# Patient Record
Sex: FEMALE | Race: WHITE | NOT HISPANIC OR LATINO | Employment: OTHER | URBAN - METROPOLITAN AREA
[De-identification: names, ages, dates, MRNs, and addresses within clinical notes are randomized per-mention and may not be internally consistent; named-entity substitution may affect disease eponyms.]

---

## 2023-03-25 LAB — SARS-COV-2 RESULT: NOT DETECTED

## 2023-03-27 ENCOUNTER — HOSPITAL ENCOUNTER (OUTPATIENT)
Dept: DATA CONVERSION | Facility: HOSPITAL | Age: 68
End: 2023-03-28
Attending: SURGERY | Admitting: SURGERY
Payer: COMMERCIAL

## 2023-03-27 DIAGNOSIS — J44.9 CHRONIC OBSTRUCTIVE PULMONARY DISEASE, UNSPECIFIED (MULTI): ICD-10-CM

## 2023-03-27 DIAGNOSIS — I10 ESSENTIAL (PRIMARY) HYPERTENSION: ICD-10-CM

## 2023-03-27 DIAGNOSIS — Z87.891 PERSONAL HISTORY OF NICOTINE DEPENDENCE: ICD-10-CM

## 2023-03-27 DIAGNOSIS — I48.20 CHRONIC ATRIAL FIBRILLATION, UNSPECIFIED (MULTI): ICD-10-CM

## 2023-03-27 DIAGNOSIS — M06.9 RHEUMATOID ARTHRITIS, UNSPECIFIED (MULTI): ICD-10-CM

## 2023-03-27 DIAGNOSIS — E04.2 NONTOXIC MULTINODULAR GOITER: ICD-10-CM

## 2023-03-27 DIAGNOSIS — C73 MALIGNANT NEOPLASM OF THYROID GLAND (MULTI): ICD-10-CM

## 2023-03-27 DIAGNOSIS — Z80.8 FAMILY HISTORY OF MALIGNANT NEOPLASM OF OTHER ORGANS OR SYSTEMS: ICD-10-CM

## 2023-03-27 DIAGNOSIS — E78.5 HYPERLIPIDEMIA, UNSPECIFIED: ICD-10-CM

## 2023-03-27 DIAGNOSIS — K21.9 GASTRO-ESOPHAGEAL REFLUX DISEASE WITHOUT ESOPHAGITIS: ICD-10-CM

## 2023-03-27 LAB
CALCIUM (MG/DL) IN SER/PLAS: 8.1 MG/DL (ref 8.6–10.3)
INTRAOPERATIVE PTH: 52 PG/ML (ref 12–88)

## 2023-03-28 LAB — CALCIUM (MG/DL) IN SER/PLAS: 8.5 MG/DL (ref 8.6–10.3)

## 2023-04-17 LAB — CALCIUM (MG/DL) IN SER/PLAS: 10.1 MG/DL (ref 8.6–10.6)

## 2023-04-27 LAB
COMPLETE PATHOLOGY REPORT: NORMAL
CONVERTED CLINICAL DIAGNOSIS-HISTORY: NORMAL
CONVERTED DIAGNOSIS COMMENT: NORMAL
CONVERTED FINAL DIAGNOSIS: NORMAL
CONVERTED FINAL REPORT PDF LINK TO COPY AND PASTE: NORMAL
CONVERTED GROSS DESCRIPTION: NORMAL
CONVERTED SYNOPTIC DIAGNOSIS: NORMAL

## 2023-07-13 LAB
ALANINE AMINOTRANSFERASE (SGPT) (U/L) IN SER/PLAS: 16 U/L (ref 7–45)
ALBUMIN (G/DL) IN SER/PLAS: 4.1 G/DL (ref 3.4–5)
ALKALINE PHOSPHATASE (U/L) IN SER/PLAS: 61 U/L (ref 33–136)
ANION GAP IN SER/PLAS: 10 MMOL/L (ref 10–20)
ASPARTATE AMINOTRANSFERASE (SGOT) (U/L) IN SER/PLAS: 18 U/L (ref 9–39)
BILIRUBIN TOTAL (MG/DL) IN SER/PLAS: 0.5 MG/DL (ref 0–1.2)
CALCIUM (MG/DL) IN SER/PLAS: 9.7 MG/DL (ref 8.6–10.6)
CARBON DIOXIDE, TOTAL (MMOL/L) IN SER/PLAS: 31 MMOL/L (ref 21–32)
CHLORIDE (MMOL/L) IN SER/PLAS: 106 MMOL/L (ref 98–107)
CREATININE (MG/DL) IN SER/PLAS: 0.57 MG/DL (ref 0.5–1.05)
GFR FEMALE: >90 ML/MIN/1.73M2
GLUCOSE (MG/DL) IN SER/PLAS: 84 MG/DL (ref 74–99)
POTASSIUM (MMOL/L) IN SER/PLAS: 4.7 MMOL/L (ref 3.5–5.3)
PROTEIN TOTAL: 6.5 G/DL (ref 6.4–8.2)
SODIUM (MMOL/L) IN SER/PLAS: 142 MMOL/L (ref 136–145)
THYROTROPIN (MIU/L) IN SER/PLAS BY DETECTION LIMIT <= 0.05 MIU/L: 0.07 MIU/L (ref 0.44–3.98)
THYROXINE (T4) FREE (NG/DL) IN SER/PLAS: 1.55 NG/DL (ref 0.78–1.48)
UREA NITROGEN (MG/DL) IN SER/PLAS: 13 MG/DL (ref 6–23)

## 2023-07-17 LAB
THYROGLOBULIN AB (IU/ML) IN SER/PLAS: <0.9 IU/ML (ref 0–4)
THYROGLOBULIN LC-MS/MS: ABNORMAL NG/ML (ref 1.3–31.8)
THYROGLOBULIN: 0.1 NG/ML (ref 1.3–31.8)

## 2023-08-21 PROBLEM — K21.9 ESOPHAGEAL REFLUX: Status: ACTIVE | Noted: 2023-08-21

## 2023-08-21 PROBLEM — I20.9 ANGINA PECTORIS (CMS-HCC): Status: ACTIVE | Noted: 2023-08-21

## 2023-08-21 PROBLEM — N95.2 VAGINAL ATROPHY: Status: ACTIVE | Noted: 2023-08-21

## 2023-08-21 PROBLEM — R06.00 DYSPNEA: Status: ACTIVE | Noted: 2023-08-21

## 2023-08-21 PROBLEM — M79.10 MYALGIA: Status: ACTIVE | Noted: 2023-08-21

## 2023-08-21 PROBLEM — R33.9 URINARY RETENTION WITH INCOMPLETE BLADDER EMPTYING: Status: ACTIVE | Noted: 2023-08-21

## 2023-08-21 PROBLEM — M25.569 KNEE PAIN: Status: ACTIVE | Noted: 2023-08-21

## 2023-08-21 PROBLEM — M75.41 IMPINGEMENT SYNDROME OF RIGHT SHOULDER: Status: ACTIVE | Noted: 2023-08-21

## 2023-08-21 PROBLEM — M25.549 PAIN IN JOINT, HAND: Status: ACTIVE | Noted: 2023-08-21

## 2023-08-21 PROBLEM — M25.551 RIGHT HIP PAIN: Status: ACTIVE | Noted: 2023-08-21

## 2023-08-21 PROBLEM — M19.90 OSTEOARTHRITIS: Status: ACTIVE | Noted: 2023-08-21

## 2023-08-21 PROBLEM — K64.2 THIRD DEGREE HEMORRHOIDS: Status: ACTIVE | Noted: 2023-08-21

## 2023-08-21 PROBLEM — M65.30 TRIGGER FINGER, ACQUIRED: Status: ACTIVE | Noted: 2023-08-21

## 2023-08-21 PROBLEM — N39.3 FEMALE STRESS INCONTINENCE: Status: ACTIVE | Noted: 2023-08-21

## 2023-08-21 PROBLEM — R11.2 NAUSEA AND VOMITING: Status: ACTIVE | Noted: 2023-08-21

## 2023-08-21 PROBLEM — R06.02 SHORTNESS OF BREATH ON EXERTION: Status: ACTIVE | Noted: 2023-08-21

## 2023-08-21 PROBLEM — M25.811 IMPINGEMENT OF RIGHT SHOULDER: Status: ACTIVE | Noted: 2023-08-21

## 2023-08-21 PROBLEM — S42.309A FRACTURE OF HUMERUS: Status: ACTIVE | Noted: 2023-08-21

## 2023-08-21 PROBLEM — M25.531 RIGHT WRIST PAIN: Status: ACTIVE | Noted: 2023-08-21

## 2023-08-21 PROBLEM — M34.9 SCLERODERMA (MULTI): Status: ACTIVE | Noted: 2023-08-21

## 2023-08-21 PROBLEM — N88.9 CERVICAL LESION: Status: ACTIVE | Noted: 2023-08-21

## 2023-08-21 PROBLEM — M25.511 RIGHT SHOULDER PAIN: Status: ACTIVE | Noted: 2023-08-21

## 2023-08-21 PROBLEM — M15.9 GENERALIZED OSTEOARTHRITIS: Status: ACTIVE | Noted: 2023-08-21

## 2023-08-21 PROBLEM — R07.9 CHEST PAIN: Status: ACTIVE | Noted: 2023-08-21

## 2023-08-21 PROBLEM — N81.3 CYSTOCELE AND RECTOCELE WITH COMPLETE UTEROVAGINAL PROLAPSE: Status: ACTIVE | Noted: 2023-08-21

## 2023-08-21 PROBLEM — R32 URINARY INCONTINENCE IN FEMALE: Status: ACTIVE | Noted: 2023-08-21

## 2023-08-21 PROBLEM — I10 BENIGN ESSENTIAL HYPERTENSION: Status: ACTIVE | Noted: 2023-08-21

## 2023-08-21 PROBLEM — N63.0 BREAST LUMP: Status: ACTIVE | Noted: 2023-08-21

## 2023-08-21 PROBLEM — N95.0 POSTMENOPAUSAL BLEEDING: Status: ACTIVE | Noted: 2023-08-21

## 2023-08-21 PROBLEM — S49.90XA SHOULDER INJURY: Status: ACTIVE | Noted: 2023-08-21

## 2023-08-21 PROBLEM — I73.00 RAYNAUD'S DISEASE: Status: ACTIVE | Noted: 2023-08-21

## 2023-08-21 PROBLEM — M19.90 ARTHRITIS: Status: ACTIVE | Noted: 2023-08-21

## 2023-08-21 PROBLEM — N81.10 FEMALE BLADDER PROLAPSE: Status: ACTIVE | Noted: 2023-08-21

## 2023-08-21 PROBLEM — M34.9 SYSTEMIC SCLEROSIS (MULTI): Status: ACTIVE | Noted: 2023-08-21

## 2023-08-21 RX ORDER — DOCUSATE SODIUM 100 MG/1
100 CAPSULE, LIQUID FILLED ORAL 2 TIMES DAILY
COMMUNITY
Start: 2019-02-12 | End: 2023-11-27 | Stop reason: ALTCHOICE

## 2023-08-21 RX ORDER — METOPROLOL SUCCINATE 25 MG/1
1 TABLET, EXTENDED RELEASE ORAL DAILY
COMMUNITY
Start: 2022-09-19 | End: 2023-10-12 | Stop reason: ALTCHOICE

## 2023-08-21 RX ORDER — OMEPRAZOLE 20 MG/1
1 CAPSULE, DELAYED RELEASE ORAL EVERY 24 HOURS
COMMUNITY
End: 2023-10-12 | Stop reason: ALTCHOICE

## 2023-08-21 RX ORDER — CHOLECALCIFEROL (VITAMIN D3) 25 MCG
1 TABLET ORAL DAILY
COMMUNITY
Start: 2022-02-22 | End: 2023-10-12 | Stop reason: ALTCHOICE

## 2023-08-21 RX ORDER — ONDANSETRON 4 MG/1
4 TABLET, ORALLY DISINTEGRATING ORAL EVERY 6 HOURS PRN
COMMUNITY
Start: 2019-01-05 | End: 2023-11-27 | Stop reason: ALTCHOICE

## 2023-08-21 RX ORDER — OMEPRAZOLE 40 MG/1
40 CAPSULE, DELAYED RELEASE ORAL
COMMUNITY
Start: 2013-09-04 | End: 2024-02-21

## 2023-08-21 RX ORDER — GABAPENTIN 300 MG/1
1 CAPSULE ORAL 3 TIMES DAILY
COMMUNITY
Start: 2023-01-31

## 2023-08-21 RX ORDER — ACETAMINOPHEN 500 MG
1000 TABLET ORAL EVERY 6 HOURS PRN
COMMUNITY

## 2023-08-21 RX ORDER — FLUTICASONE FUROATE, UMECLIDINIUM BROMIDE AND VILANTEROL TRIFENATATE 100; 62.5; 25 UG/1; UG/1; UG/1
1 POWDER RESPIRATORY (INHALATION) DAILY
COMMUNITY
Start: 2023-03-03

## 2023-08-21 RX ORDER — HYDROXYCHLOROQUINE SULFATE 200 MG/1
200 TABLET ORAL DAILY
COMMUNITY
Start: 2012-07-31 | End: 2024-04-08 | Stop reason: SDUPTHER

## 2023-08-21 RX ORDER — DILTIAZEM HYDROCHLORIDE 60 MG/1
60 TABLET, FILM COATED ORAL 2 TIMES DAILY
COMMUNITY
End: 2024-04-15

## 2023-08-21 RX ORDER — OXYCODONE AND ACETAMINOPHEN 5; 325 MG/1; MG/1
1 TABLET ORAL EVERY 4 HOURS
COMMUNITY
Start: 2019-02-12 | End: 2023-10-12 | Stop reason: ALTCHOICE

## 2023-08-21 RX ORDER — VITAMIN B COMPLEX
1 CAPSULE ORAL DAILY
COMMUNITY
Start: 2022-02-22

## 2023-08-21 RX ORDER — ASCORBIC ACID 500 MG
1 TABLET ORAL DAILY
COMMUNITY
Start: 2022-02-22

## 2023-08-21 RX ORDER — LEVOTHYROXINE SODIUM 112 UG/1
TABLET ORAL
COMMUNITY
End: 2023-10-12 | Stop reason: SDUPTHER

## 2023-08-21 RX ORDER — NAPROXEN SODIUM 220 MG/1
1 TABLET, FILM COATED ORAL DAILY
COMMUNITY

## 2023-08-21 RX ORDER — CALCIUM CARBONATE 500(1250)
1 TABLET ORAL DAILY
COMMUNITY

## 2023-08-21 RX ORDER — DICLOFENAC SODIUM 75 MG/1
75 TABLET, DELAYED RELEASE ORAL 2 TIMES DAILY PRN
COMMUNITY
Start: 2012-07-31 | End: 2023-10-28

## 2023-08-21 RX ORDER — METHOTREXATE 2.5 MG/1
TABLET ORAL
COMMUNITY
Start: 2012-07-31 | End: 2023-10-09

## 2023-08-21 RX ORDER — LOSARTAN POTASSIUM 100 MG/1
TABLET ORAL
COMMUNITY
Start: 2022-06-28

## 2023-08-21 RX ORDER — BUDESONIDE, GLYCOPYRROLATE, AND FORMOTEROL FUMARATE 160; 9; 4.8 UG/1; UG/1; UG/1
2 AEROSOL, METERED RESPIRATORY (INHALATION) 2 TIMES DAILY
COMMUNITY
Start: 2022-09-14 | End: 2023-10-12 | Stop reason: ALTCHOICE

## 2023-08-21 RX ORDER — GINSENG 100 MG
1 CAPSULE ORAL DAILY
COMMUNITY
End: 2023-10-12 | Stop reason: ALTCHOICE

## 2023-08-21 RX ORDER — CHOLECALCIFEROL (VITAMIN D3) 50 MCG
100 TABLET ORAL DAILY
COMMUNITY
End: 2023-11-27 | Stop reason: ALTCHOICE

## 2023-08-21 RX ORDER — ROSUVASTATIN CALCIUM 10 MG/1
10 TABLET, COATED ORAL DAILY
COMMUNITY

## 2023-09-02 RX ORDER — IBUPROFEN 600 MG/1
600 TABLET ORAL EVERY 8 HOURS
COMMUNITY
Start: 2019-02-12 | End: 2023-10-12 | Stop reason: ALTCHOICE

## 2023-09-07 VITALS
DIASTOLIC BLOOD PRESSURE: 57 MMHG | BODY MASS INDEX: 27.73 KG/M2 | HEIGHT: 59 IN | RESPIRATION RATE: 16 BRPM | TEMPERATURE: 97.7 F | SYSTOLIC BLOOD PRESSURE: 102 MMHG | HEART RATE: 71 BPM | WEIGHT: 137.57 LBS

## 2023-09-14 NOTE — H&P
History of Present Illness:   History Present Illness:  Reason for surgery: multinodular goiter   HPI:    68F with hx of multinodular thyroid gland, TI-RADS 5, with 1.5cm on right and 3.2 cm on left. Has mild presure sensation in anterior neck, sh eis euthyroid. Hx of  thyroid cancer on father's side.     PMH: GERD, rheumatoid arthritis, scleroderma  PSH: bladder surgery, hysterectomy  Soc: former smokr  All: adhesive tape, latex      Allergies:        Allergies:  ·  NKDA :   ·  Tape  - Adhesive, Bandaids, Paper: Blistering Dis.    Home Medication Review:   Home Medications Reviewed: yes     Impression/Procedure:   ·  Impression and Planned Procedure: total thyroidectomy       ERAS (Enhanced Recovery After Surgery):  ·  ERAS Patient: no       Vital Signs:  Temperature C: 36.5 degrees C   Temperature F: 97.7 degrees F   Heart Rate: 71 beats per minute   Respiratory Rate: 16 breath per minute   Blood Pressure Systolic: 102 mm/Hg   Blood Pressure Diastolic: 57 mm/Hg     Physical Exam by System:    Respiratory/Thorax: No increased wob on ra   Cardiovascular: rrr     Consent:   COVID-19 Consent:  ·  COVID-19 Risk Consent Surgeon has reviewed key risks related to the risk of radha COVID-19 and if they contract COVID-19 what the risks are.     Attestation:   Note Completion:  I am a:  Resident/Fellow   Attending Attestation I saw and evaluated the patient.  I personally obtained the key and critical portions of the history and physical exam or was physically present for key and  critical portions performed by the resident/fellow. I reviewed the resident/fellow?s documentation and discussed the patient with the resident/fellow.  I agree with the resident/fellow?s medical decision making as documented in the note.     I personally evaluated the patient on 27-Mar-2023         Electronic Signatures:  Sarika Ly (Resident))  (Signed 27-Mar-2023 13:52)   Authored: History of Present Illness, Allergies, Home   Medication Review, Impression/Procedure, ERAS, Physical Exam, Consent, Note Completion  Praveen Sierra)  (Signed 27-Mar-2023 16:33)   Authored: Note Completion   Co-Signer: History of Present Illness, Allergies, Home Medication Review, Impression/Procedure, ERAS, Physical Exam, Consent, Note Completion      Last Updated: 27-Mar-2023 16:33 by Praveen Sierra)

## 2023-09-14 NOTE — PROGRESS NOTES
Service: Surgery     Subjective Data:   BRIDGER SINGH is a 68 year old Female who is Hospital Day # 2 and POD #1 for Total thyroidectomy.    Overnight Events: Patient had an uneventful night.   Additional Information:    Patient states some difficulty and discomfort when swallowing pills.  Denies any n/v.      Objective Data:     Objective Information:      T   P  R  BP   MAP  SpO2   Value  36.6  84  17  108/68   89  95%  Date/Time 3/28 7:41 3/28 7:41 3/28 7:41 3/28 7:41  3/28 3:29 3/28 7:41  Range  (36.2C - 36.8C )  (79 - 84 )  (16 - 17 )  (108 - 135 )/ (68 - 86 )  (89 - 100 )  (95% - 98% )   As of 27-Mar-2023 21:30:00, patient is on 1 L/min of oxygen via nasal cannula.      Pain reported at 3/28 0:30: sleeping    ---- Intake and Output  -----  Mn/Dy/Year Time  Intake   Output  Net  Mar 27, 2023 10:00 pm  1250   10  1240    The Intake and Output Totals for the last 24 hours are:      Intake   Output  Net      1250   10  1240    Physical Exam by System:    Constitutional: Well developed, awake/alert/oriented  x3, no distress, alert and cooperative   Eyes: Clear sclera   Head/Neck: Anterior neck incision with Dermabond,  C/D/I, edges well approximated, minor bruising without drainage or hematoma. Minimal localized edema.   Respiratory/Thorax: Patent airways, diminished throughout,  normal breath sounds with good chest expansion, thorax symmetric, on room air   Cardiovascular: Regular, rate and rhythm, +2 pulses  in all extremities   Gastrointestinal: Soft, nondistended, nontender,  positive bowel sounds   Genitourinary: voiding independently   Musculoskeletal: STARR x4   Neurological: No focal deficits   Psychological: Appropriate mood and behavior     Medication:    Medications:          Continuous Medications       --------------------------------  No continuous medications are active       Scheduled Medications       --------------------------------    1. Acetaminophen:  650  mg  Oral  Every 6 Hours    2.  dilTIAZem (CARDIZEM) Immediate Release:  60  mg  Oral  Every 12 Hours    3. Docusate:  100  mg  Oral  2 Times a Day    4. Gabapentin:  300  mg  Oral  3 Times a Day    5. Hydroxychloroquine:  200  mg  Oral  Daily    6. Levothyroxine:  112  microgram(s)  Oral  Daily    7. Pantoprazole:  40  mg  Oral  Daily    8. Scopolamine TransDermal:  1  patch  TransDermal  Every 72 Hours         PRN Medications       --------------------------------    1. Calcium Carbonate:  2500  mg  Oral  3 Times a Day    2. Calcium Carbonate:  2500  mg  Oral  3 Times a Day    3. Calcium Gluconate 1 gram/ NaCL 0.67% 50 mL Premix IVPB:  50  mL  IntraVenous Piggyback  Once    4. Ondansetron Injectable:  4  mg  IntraVenous Push  Every 4 Hours    5. oxyCODONE Immediate Release:  5  mg  Oral  Every 4 Hours    6. oxyCODONE Immediate Release:  10  mg  Oral  Every 4 Hours    7. Sodium Chloride 0.9% Injectable Flush:  10  mL  IntraVenous Flush  Every 8 Hours and as Needed    8. Sore Throat Lozenge:  1  lozenge(s)  Oral  Every 2 Hours        Recent Lab Results:    Results:      ---------- Blood Chemistry ----------   3/28/2023 07:18     Calcium, Serum     8.5 L  3/27/2023 21:25     Calcium, Serum     8.1 L    Assessment and Plan:        Medical History:   Multinodular thyroid: Entered Date: 27-Mar-2023 16:04    Code Status:  ·  Code Status Full Code     Assessment:    BRIDGER SINGH is a 68 year old Female who is Hospital Day # 2 and POD #1 for Total thyroidectomy.    Plan:    Neuro: Acute post op pain well controlled with current pain regimen  -Hx of RA and scleroderma  -Continue Tylenol and Oxycodone PRN  - OOB/ ambulate 5x per day   - OARRS Reviewed: No report found   -Continue home gabapentin       CV: VSS  -Hx of HLD and HTN   -Continue home Cardizem   - VS every 4 hours       Pulm: Diminished lungs sounds on RA   - IS every hour while awake   - Pulse ox every 4 hours with VS     GI: Tolerating diet, denies N/V  - Hx of GERD  - Continue daily  PPI  - PRN antiemetic and scheduled Scopolamine patch   - Bowel regimen: Continue Colace BID   -Continue regular diet as tolerated     : Voiding independently   - Monitor I&Os     HEME: DVT Proph   - SCDs/ ambulate/home asa to start on 3/30/2023   - no s/s of bleeding       Endo: Post op day 1 total thyroidectomy. Intra-op findings showed nodule at right upper inferior pole and left upper  - Hx of multinodular thyroid   - Calcium 8.5 on morning labs, No numbness or tingling   - Home with Calcium Carb 1200mg TID  - Synthroid 112mcg daily to take before meals      ID: Afebrile   - Monitor for s/s infection.      Dispo; Plan for discharge home this afternoon.  Follow up with Dr. Sierra in two weeks.     Total time spent 35 minutes, and greater than 50% of  time was spent in counseling/coordination of care      Plan of Care Reviewed With:  Plan of Care Reviewed With: patient       Electronic Signatures:  Kim Sifuentes (APRN-CNP)  (Signed 28-Mar-2023 11:27)   Authored: Service, Assessment/Plan Review, Subjective  Data, Objective Data, Assessment and Plan, Note Completion      Last Updated: 28-Mar-2023 11:27 by Kim Sifuentes (APRN-CNP)

## 2023-09-14 NOTE — DISCHARGE SUMMARY
Send Summary:   Discharge Summary Providers:  Provider Role Provider Name   · Attending Praveen Sierra   · Referring Dori Khan   · Primary Jurgen Friedman       Note Recipients: Dori Khan MD Thomas Jurgen, DO - [phone] []       Discharge:    Summary:   Admission Date: .27-Mar-2023 09:31:00   Discharge Date: 28-Mar-2023   Admission Reason: Total thyroidectomy   Final Discharge Diagnoses: Total Thyroidectomy   Procedures: Date: 27-Mar-2023 16:00:00  Procedure Name: Total thyroidectomy   Hospital Course:    Briefly, the patient is a 68 year-old female with prior hx of multinodular thyriod.  Pt now opts for complete thyroidectomy.   HOSPITAL COURSE: The patient underwent total thyroidectomy on 3/27/2023, which patient tolerated very well and remained stable in the postoperative period. On postoperative day #0, patient was tolerating a diet, and remained afebrile with stable vital  signs. Patient was ordered oral and intravenous narcotics for pain control.  Patient did/not require replacement of calcium per protocol.  Patient was judged stable for discharge home on 3/28/2023, tolerating a diet well, afebrile, stable vital signs  and lab values, with adequate pain control. The wound was clean and dry. Patient was instructed to follow up in the office within two weeks after discharge and was given prescription for oxycodone for pain.  Pt advised not to take ASA or other NSAIDs  for 2 days post discharge.        Discharge Information:    and Continuing Care:   Lab Results - Pending:    Surgical Pathology Drawn at 27-Mar-2023 15:27:00  Radiology Results - Pending: None   Discharge Instructions:    Additional Orders:           Additional Instructions:   You can resume your aspirin and methotrexate on 3/30/2023.     Resumes home meds except no ASA, NSAIDS for 48 hours after discharge   Shower day after discharge  No Lifting > 20lbs x 2 weeks, No driving x 5 days, Return to work in 1 week.  F/U appointment in 2  weeks   Take Synthroid as prescribed.  Take 30-60 prior to meals on an empty stomach.     Follow Up Appointments:    Follow-Up - Vascular Surgeon:           Physician/Dept./Service:   Vascular Surgeon    Follow-Up Appointment 01:           Physician/Dept/Service:   Dr. Praveen Sierra          Reason for Referral:   post op appointment          Phone Number:   [phone].    Discharge Medications: Home Medication   diclofenac sodium 75 mg oral delayed release tablet - 1 tab(s) orally 2 times a day  omeprazole 40 mg oral delayed release capsule - 1 cap(s) orally once a day  Plaquenil 200 mg oral tablet - 1 tab(s) orally once a day  methotrexate 10 mg oral tablet - 8 tab(s) orally once a week  rosuvastatin 10 mg oral tablet - 1 tab(s) orally once a day (at bedtime)  dilTIAZem 60 mg oral tablet - 1 tab(s) orally 2 times a day  gabapentin 300 mg oral capsule - 1 cap(s) orally 3 times a day  biotin 1000 mcg oral tablet - 1 tab(s) orally once a day (at bedtime)  hydroxychloroquine 200 mg oral tablet - 1 tab(s) orally once a day  Vitamin D3 25 mcg (1000 intl units) oral tablet - 1 tab(s) orally once a day  ZyrTEC 10 mg oral tablet - 1 tab(s) orally once a day (at bedtime)  Trelegy Ellipta 100 mcg-62.5 mcg-25 mcg/inh inhalation powder - 1 puff(s) inhaled once a day  acetaminophen 325 mg oral tablet - 2 tab(s) orally every 6 hours  Colace 100 mg oral capsule - 1 cap(s) orally 2 times a day   oxycodone-acetaminophen 5 mg-325 mg oral tablet - 1 tab(s) orally every 4 hours   calcium (as carbonate) 600 mg oral tablet - 2 tab(s) orally 3 times a day   levothyroxine 112 mcg (0.112 mg) oral tablet - 1 tab(s) orally once a day     PRN Medication     DNR Status:   ·  Code Status Code Status order at time of discharge: Full Code       Electronic Signatures:  Kim Sifuentes (APRN-CNP)  (Signed 28-Mar-2023 14:03)   Authored: Send Summary, Summary Content, Ongoing Care,  DNR Status, Note Completion      Last Updated: 28-Mar-2023 14:03  by Kim Sifuentes (APRN-CNP)

## 2023-10-02 ENCOUNTER — OFFICE VISIT (OUTPATIENT)
Dept: RHEUMATOLOGY | Facility: CLINIC | Age: 68
End: 2023-10-02
Payer: MEDICARE

## 2023-10-02 VITALS
WEIGHT: 130 LBS | BODY MASS INDEX: 26.21 KG/M2 | DIASTOLIC BLOOD PRESSURE: 79 MMHG | HEIGHT: 59 IN | HEART RATE: 65 BPM | SYSTOLIC BLOOD PRESSURE: 138 MMHG

## 2023-10-02 DIAGNOSIS — M34.9 SYSTEMIC SCLEROSIS (MULTI): Primary | ICD-10-CM

## 2023-10-02 PROCEDURE — 1160F RVW MEDS BY RX/DR IN RCRD: CPT | Performed by: INTERNAL MEDICINE

## 2023-10-02 PROCEDURE — 3078F DIAST BP <80 MM HG: CPT | Performed by: INTERNAL MEDICINE

## 2023-10-02 PROCEDURE — 99213 OFFICE O/P EST LOW 20 MIN: CPT | Performed by: INTERNAL MEDICINE

## 2023-10-02 PROCEDURE — 3075F SYST BP GE 130 - 139MM HG: CPT | Performed by: INTERNAL MEDICINE

## 2023-10-02 PROCEDURE — 1159F MED LIST DOCD IN RCRD: CPT | Performed by: INTERNAL MEDICINE

## 2023-10-02 PROCEDURE — 1125F AMNT PAIN NOTED PAIN PRSNT: CPT | Performed by: INTERNAL MEDICINE

## 2023-10-02 ASSESSMENT — ENCOUNTER SYMPTOMS
OCCASIONAL FEELINGS OF UNSTEADINESS: 0
DEPRESSION: 0
LOSS OF SENSATION IN FEET: 1

## 2023-10-02 NOTE — PROGRESS NOTES
Subjective   Patient ID: Radha Restrepo is a 68 y.o. female who presents for Scleroderma and Systemic sclerosis  (5 month follow up ).  HPI  Systemic sclerosis with features of Raynaud's, GERD, spinal stenosis, osteopenia.  Also has nonobstructing coronary artery disease, COPD.    Last seen in January at that time we had her continue with methotrexate and hydroxychloroquine    She still gets occasional heartburn and uses Zofran may be once or twice a week.  Denies any rashes.  No bowel issues.    Her left thumb still hurts her.  He had discussed it at her last visit that I felt it was secondary to osteoarthritis rather than an inflammatory process.    Her feet go numb by evening.  She has swelling in her hands and wears compression gloves.  Review of Systems   Constitutional:  Positive for fatigue.   HENT:  Negative for congestion.    Respiratory:  Negative for shortness of breath.    Gastrointestinal:         Gets occasional reflux and nausea and will take Zofran.   Genitourinary:  Negative for dysuria.   Musculoskeletal:  Positive for arthralgias.   Skin:  Positive for rash.   Neurological:  Positive for numbness.   Hematological:  Negative for adenopathy.       Objective   Physical Exam  GEN: NAD A&O x3 appropriate affect  HENT:no enlarged glands or thyroid  EYES:  no conjunctival redness, eyelids normal  LYMPH: no cervical lymph nodes  SKIN: no rashes seen on exposed skin  PULSES: +radials  TENDER POINTS: 0/18   MSK:  Squaring at the left CMC and first MCP joint  Hypertrophic changes the DIP and PIP of the bilateral hands  No swelling of the elbows  Anterior rotation bilateral shoulders  No swelling of the knees  No swelling of the ankles  Assessment/Plan        Systemic sclerosis with features of positive anti-SCL 70, Raynaud's, GERD, calcifications.  Also has COPD and nonobstructive coronary artery disease.  Currently on Plaquenil and methotrexate.  Reviewed her recent blood work and we will put in blood  work for her next visit.    Osteopenia s last bone density January 17, 2023.  Left total hip -1, left femoral neck -1.4, lumbar spine L1-L2 -1.7.  Discussed that she could do IV Reclast.  We will reevaluate at her next bone density and if there is worsening then we will move forward with this.  Discussed about increasing weightbearing activity.

## 2023-10-02 NOTE — OP NOTE
PROCEDURE DETAILS    Preoperative Diagnosis:  Multinodular thyroid  Postoperative Diagnosis:  Multinodular thyroid  Surgeon: Dr. Sierra  Resident/Fellow/Other Assistant: Dr. Ly    Procedure:  Total thyroidectomy  Anesthesia: GETA  Estimated Blood Loss: 5  Findings: Nodule at right inferior pole and left upper  Specimens(s) Collected: yes,  thyroid   Complications: none  IV Fluids: 900  Patient Returned To/Condition: pacu stable    Date of Dictation: 27-Mar-2023  Dictation Job Number: [id number]                            Attestation:   Note Completion:  Attending Attestation I was present for the entire procedure    I am a: Resident/Fellow         Electronic Signatures:  Sarika Ly (Resident))  (Signed 27-Mar-2023 16:02)   Authored: Post-Operative Note, Chart Review, Note Completion  Praveen Sierra)  (Signed 27-Mar-2023 16:34)   Authored: Post-Operative Note, Chart Review, Note Completion   Co-Signer: Post-Operative Note, Chart Review, Note Completion      Last Updated: 27-Mar-2023 16:34 by Praveen Sierra)

## 2023-10-03 ASSESSMENT — ENCOUNTER SYMPTOMS
SHORTNESS OF BREATH: 0
ADENOPATHY: 0
DYSURIA: 0
FATIGUE: 1
NUMBNESS: 1
ARTHRALGIAS: 1

## 2023-10-09 DIAGNOSIS — M34.9 SYSTEMIC SCLEROSIS (MULTI): Primary | ICD-10-CM

## 2023-10-09 RX ORDER — METHOTREXATE 2.5 MG/1
TABLET ORAL
Qty: 96 TABLET | Refills: 1 | Status: SHIPPED | OUTPATIENT
Start: 2023-10-09 | End: 2024-04-08 | Stop reason: SDUPTHER

## 2023-10-12 ENCOUNTER — OFFICE VISIT (OUTPATIENT)
Dept: ENDOCRINOLOGY | Facility: CLINIC | Age: 68
End: 2023-10-12
Payer: MEDICARE

## 2023-10-12 ENCOUNTER — LAB (OUTPATIENT)
Dept: LAB | Facility: LAB | Age: 68
End: 2023-10-12
Payer: MEDICARE

## 2023-10-12 VITALS
WEIGHT: 130 LBS | RESPIRATION RATE: 16 BRPM | SYSTOLIC BLOOD PRESSURE: 124 MMHG | HEART RATE: 80 BPM | HEIGHT: 59 IN | BODY MASS INDEX: 26.21 KG/M2 | DIASTOLIC BLOOD PRESSURE: 68 MMHG

## 2023-10-12 DIAGNOSIS — E03.9 HYPOTHYROIDISM, UNSPECIFIED TYPE: ICD-10-CM

## 2023-10-12 DIAGNOSIS — C73 PAPILLARY MICROCARCINOMA OF THYROID (MULTI): ICD-10-CM

## 2023-10-12 DIAGNOSIS — E03.9 HYPOTHYROIDISM, UNSPECIFIED TYPE: Primary | ICD-10-CM

## 2023-10-12 DIAGNOSIS — E83.51 HYPOCALCEMIA: ICD-10-CM

## 2023-10-12 DIAGNOSIS — M34.9 SYSTEMIC SCLEROSIS (MULTI): ICD-10-CM

## 2023-10-12 LAB
ALBUMIN SERPL BCP-MCNC: 4.4 G/DL (ref 3.4–5)
ALP SERPL-CCNC: 62 U/L (ref 33–136)
ALT SERPL W P-5'-P-CCNC: 15 U/L (ref 7–45)
ANION GAP SERPL CALC-SCNC: 14 MMOL/L (ref 10–20)
AST SERPL W P-5'-P-CCNC: 17 U/L (ref 9–39)
BILIRUB SERPL-MCNC: 0.5 MG/DL (ref 0–1.2)
BUN SERPL-MCNC: 17 MG/DL (ref 6–23)
CALCIUM SERPL-MCNC: 9.4 MG/DL (ref 8.6–10.6)
CHLORIDE SERPL-SCNC: 105 MMOL/L (ref 98–107)
CO2 SERPL-SCNC: 27 MMOL/L (ref 21–32)
CREAT SERPL-MCNC: 0.59 MG/DL (ref 0.5–1.05)
CRP SERPL-MCNC: 0.18 MG/DL
ERYTHROCYTE [DISTWIDTH] IN BLOOD BY AUTOMATED COUNT: 13.8 % (ref 11.5–14.5)
ERYTHROCYTE [SEDIMENTATION RATE] IN BLOOD BY WESTERGREN METHOD: 3 MM/H (ref 0–30)
GFR SERPL CREATININE-BSD FRML MDRD: >90 ML/MIN/1.73M*2
GLUCOSE SERPL-MCNC: 52 MG/DL (ref 74–99)
HCT VFR BLD AUTO: 40.7 % (ref 36–46)
HGB BLD-MCNC: 12.8 G/DL (ref 12–16)
MCH RBC QN AUTO: 30.4 PG (ref 26–34)
MCHC RBC AUTO-ENTMCNC: 31.4 G/DL (ref 32–36)
MCV RBC AUTO: 97 FL (ref 80–100)
NRBC BLD-RTO: 0 /100 WBCS (ref 0–0)
PLATELET # BLD AUTO: 277 X10*3/UL (ref 150–450)
PMV BLD AUTO: 11.2 FL (ref 7.5–11.5)
POTASSIUM SERPL-SCNC: 4.8 MMOL/L (ref 3.5–5.3)
PROT SERPL-MCNC: 6.7 G/DL (ref 6.4–8.2)
RBC # BLD AUTO: 4.21 X10*6/UL (ref 4–5.2)
SODIUM SERPL-SCNC: 141 MMOL/L (ref 136–145)
TSH SERPL-ACNC: 1.18 MIU/L (ref 0.44–3.98)
WBC # BLD AUTO: 6.1 X10*3/UL (ref 4.4–11.3)

## 2023-10-12 PROCEDURE — 99214 OFFICE O/P EST MOD 30 MIN: CPT | Performed by: INTERNAL MEDICINE

## 2023-10-12 PROCEDURE — 80053 COMPREHEN METABOLIC PANEL: CPT

## 2023-10-12 PROCEDURE — 1125F AMNT PAIN NOTED PAIN PRSNT: CPT | Performed by: INTERNAL MEDICINE

## 2023-10-12 PROCEDURE — 85027 COMPLETE CBC AUTOMATED: CPT

## 2023-10-12 PROCEDURE — 84443 ASSAY THYROID STIM HORMONE: CPT

## 2023-10-12 PROCEDURE — 1159F MED LIST DOCD IN RCRD: CPT | Performed by: INTERNAL MEDICINE

## 2023-10-12 PROCEDURE — 1160F RVW MEDS BY RX/DR IN RCRD: CPT | Performed by: INTERNAL MEDICINE

## 2023-10-12 PROCEDURE — 3078F DIAST BP <80 MM HG: CPT | Performed by: INTERNAL MEDICINE

## 2023-10-12 PROCEDURE — 86140 C-REACTIVE PROTEIN: CPT

## 2023-10-12 PROCEDURE — 85652 RBC SED RATE AUTOMATED: CPT

## 2023-10-12 PROCEDURE — 3074F SYST BP LT 130 MM HG: CPT | Performed by: INTERNAL MEDICINE

## 2023-10-12 PROCEDURE — 1036F TOBACCO NON-USER: CPT | Performed by: INTERNAL MEDICINE

## 2023-10-12 PROCEDURE — 36415 COLL VENOUS BLD VENIPUNCTURE: CPT

## 2023-10-12 RX ORDER — TRAZODONE HYDROCHLORIDE 50 MG/1
25 TABLET ORAL NIGHTLY
COMMUNITY

## 2023-10-12 RX ORDER — LEVOTHYROXINE SODIUM 112 UG/1
112 TABLET ORAL DAILY
Qty: 90 TABLET | Refills: 3 | Status: SHIPPED | OUTPATIENT
Start: 2023-10-12 | End: 2024-10-11

## 2023-10-12 ASSESSMENT — ENCOUNTER SYMPTOMS
VOMITING: 0
COUGH: 0
HEADACHES: 0
FEVER: 0
CHILLS: 0
FATIGUE: 0
PALPITATIONS: 0
DIARRHEA: 0
NAUSEA: 0
SHORTNESS OF BREATH: 0

## 2023-10-12 NOTE — PROGRESS NOTES
"Subjective   Patient ID: Radha Restrepo is a 68 y.o. female who presents for Goiter.  HPI  Last seen 3 months ago,  adjusted t4 to x6 last time for high levels.  Feeling much better since. Energy now back to normal.  Neck has healed well s/p total tx for mng (path with incidental micro ptc x2, tg zero last check).   Post op hypocalcemia has resolved now on her baseline calcium supplement    Review of Systems   Constitutional:  Negative for chills, fatigue and fever.   Respiratory:  Negative for cough and shortness of breath.    Cardiovascular:  Negative for chest pain and palpitations.   Gastrointestinal:  Negative for diarrhea, nausea and vomiting.   Neurological:  Negative for headaches.       Objective     10/12/2023 9:46 AM    /68   Pulse 80   Resp 16   Weight 59 kg (130 lb)   Height 1.499 m (4' 11\")       Physical Exam  Constitutional:       Appearance: Normal appearance. She is overweight.   HENT:      Head: Normocephalic and atraumatic.   Neck:      Thyroid: No thyroid mass, thyromegaly or thyroid tenderness.      Comments: No palpable thyroid gland, scar well healed  Cardiovascular:      Rate and Rhythm: Normal rate and regular rhythm.      Heart sounds: No murmur heard.     No gallop.   Pulmonary:      Effort: Pulmonary effort is normal.      Breath sounds: Normal breath sounds.   Abdominal:      Palpations: Abdomen is soft.      Comments: benign   Neurological:      General: No focal deficit present.      Mental Status: She is alert and oriented to person, place, and time.      Deep Tendon Reflexes: Reflexes are normal and symmetric.   Psychiatric:         Behavior: Behavior is cooperative.       Assessment/Plan   Problem List Items Addressed This Visit             ICD-10-CM    Hypothyroid - Primary E03.9    Relevant Orders    TSH with reflex to Free T4 if abnormal    Papillary microcarcinoma of thyroid (CMS/HCC) C73    Hypocalcemia E83.51   Hypothyrodism:  Recheck labs today and adjust based on " levels  Papillary microcarcinoma: discussed likely incidental finding but will watch tg.  Last tg excellent  Hypocalcemia:  Recheck calcium today  Follow up in 6 months

## 2023-10-25 DIAGNOSIS — M15.9 PRIMARY OSTEOARTHRITIS INVOLVING MULTIPLE JOINTS: Primary | ICD-10-CM

## 2023-10-28 RX ORDER — DICLOFENAC SODIUM 75 MG/1
75 TABLET, DELAYED RELEASE ORAL 2 TIMES DAILY
Qty: 180 TABLET | Refills: 3 | Status: SHIPPED | OUTPATIENT
Start: 2023-10-28 | End: 2024-10-27

## 2023-11-26 PROBLEM — Z90.89 H/O TOTAL THYROIDECTOMY: Status: ACTIVE | Noted: 2023-08-14

## 2023-11-26 PROBLEM — Z98.890 H/O TOTAL THYROIDECTOMY: Status: ACTIVE | Noted: 2023-08-14

## 2023-11-26 PROBLEM — K76.89 LIVER CYST: Status: ACTIVE | Noted: 2023-08-14

## 2023-11-26 PROBLEM — G47.00 INSOMNIA: Status: ACTIVE | Noted: 2023-08-14

## 2023-11-26 PROBLEM — M81.0 OSTEOPOROSIS WITHOUT CURRENT PATHOLOGICAL FRACTURE: Status: ACTIVE | Noted: 2023-08-14

## 2023-11-26 PROBLEM — I73.00 RAYNAUD'S DISEASE WITHOUT GANGRENE: Status: ACTIVE | Noted: 2023-08-14

## 2023-11-26 PROBLEM — E04.1 CYST OF THYROID: Status: ACTIVE | Noted: 2023-08-14

## 2023-11-26 PROBLEM — J44.89 COPD (CHRONIC OBSTRUCTIVE PULMONARY DISEASE) WITH CHRONIC BRONCHITIS (MULTI): Status: ACTIVE | Noted: 2023-08-14

## 2023-11-26 PROBLEM — E78.49 HYPERLIPIDEMIA DUE TO DIETARY FAT INTAKE: Status: ACTIVE | Noted: 2023-08-14

## 2023-11-26 PROBLEM — E89.0 H/O TOTAL THYROIDECTOMY: Status: ACTIVE | Noted: 2023-08-14

## 2023-11-26 PROBLEM — K21.9 GASTROESOPHAGEAL REFLUX DISEASE WITHOUT ESOPHAGITIS: Status: ACTIVE | Noted: 2023-08-14

## 2023-11-27 ENCOUNTER — OFFICE VISIT (OUTPATIENT)
Dept: CARDIOLOGY | Facility: CLINIC | Age: 68
End: 2023-11-27
Payer: MEDICARE

## 2023-11-27 VITALS
WEIGHT: 127 LBS | RESPIRATION RATE: 18 BRPM | TEMPERATURE: 98.6 F | HEART RATE: 76 BPM | SYSTOLIC BLOOD PRESSURE: 124 MMHG | BODY MASS INDEX: 25.6 KG/M2 | DIASTOLIC BLOOD PRESSURE: 76 MMHG | HEIGHT: 59 IN

## 2023-11-27 DIAGNOSIS — I20.9 ANGINA PECTORIS (CMS-HCC): ICD-10-CM

## 2023-11-27 DIAGNOSIS — E78.2 MIXED HYPERLIPIDEMIA: ICD-10-CM

## 2023-11-27 DIAGNOSIS — R06.02 SOB (SHORTNESS OF BREATH): Primary | ICD-10-CM

## 2023-11-27 DIAGNOSIS — E78.49 HYPERLIPIDEMIA DUE TO DIETARY FAT INTAKE: ICD-10-CM

## 2023-11-27 DIAGNOSIS — I10 BENIGN ESSENTIAL HYPERTENSION: ICD-10-CM

## 2023-11-27 DIAGNOSIS — I20.89 STABLE ANGINA PECTORIS (CMS-HCC): ICD-10-CM

## 2023-11-27 PROCEDURE — 99214 OFFICE O/P EST MOD 30 MIN: CPT | Performed by: INTERNAL MEDICINE

## 2023-11-27 PROCEDURE — 1126F AMNT PAIN NOTED NONE PRSNT: CPT | Performed by: INTERNAL MEDICINE

## 2023-11-27 PROCEDURE — 1160F RVW MEDS BY RX/DR IN RCRD: CPT | Performed by: INTERNAL MEDICINE

## 2023-11-27 PROCEDURE — 3078F DIAST BP <80 MM HG: CPT | Performed by: INTERNAL MEDICINE

## 2023-11-27 PROCEDURE — 3074F SYST BP LT 130 MM HG: CPT | Performed by: INTERNAL MEDICINE

## 2023-11-27 PROCEDURE — 1159F MED LIST DOCD IN RCRD: CPT | Performed by: INTERNAL MEDICINE

## 2023-11-27 PROCEDURE — 1036F TOBACCO NON-USER: CPT | Performed by: INTERNAL MEDICINE

## 2023-11-27 RX ORDER — BUDESONIDE AND FORMOTEROL FUMARATE DIHYDRATE 160; 4.5 UG/1; UG/1
2 AEROSOL RESPIRATORY (INHALATION) 2 TIMES DAILY
COMMUNITY
Start: 2023-11-22 | End: 2024-04-15 | Stop reason: ALTCHOICE

## 2023-11-27 ASSESSMENT — PATIENT HEALTH QUESTIONNAIRE - PHQ9
1. LITTLE INTEREST OR PLEASURE IN DOING THINGS: SEVERAL DAYS
SUM OF ALL RESPONSES TO PHQ9 QUESTIONS 1 AND 2: 2
10. IF YOU CHECKED OFF ANY PROBLEMS, HOW DIFFICULT HAVE THESE PROBLEMS MADE IT FOR YOU TO DO YOUR WORK, TAKE CARE OF THINGS AT HOME, OR GET ALONG WITH OTHER PEOPLE: SOMEWHAT DIFFICULT
2. FEELING DOWN, DEPRESSED OR HOPELESS: SEVERAL DAYS

## 2023-11-27 ASSESSMENT — PAIN SCALES - GENERAL: PAINLEVEL: 0-NO PAIN

## 2023-11-27 NOTE — PROGRESS NOTES
History of present illness:  This is a very pleasant 68-year-old female self-referred to my office for evaluation of episodes of chest pains. Patient had history of hypertension and scleroderma. Patient history of remote smoking. Had in April hospitalization for chest pain with negative troponin. Cardiac catheterization in 2022 showed no major coronary disease. Patient returns to my office for follow-up. Doing overall good. She still complaining some shortness of breath with moderate activity. Very infrequent episodes of chest tightness.  Patient denies palpitations dizziness or lightheadedness.  No syncope.      Past Medical History:   Diagnosis Date    Angina pectoris (CMS/[facility]) 08/21/2023    Benign essential hypertension 08/21/2023    Cervicalgia     Neck pain    Chest pain 08/21/2023    Hyperlipidemia due to dietary fat intake 08/14/2023    Other conditions influencing health status     Osteoarthritis    Papillary microcarcinoma of thyroid (CMS/HCC) 10/12/2023    Personal history of other diseases of the digestive system     History of esophageal reflux    Personal history of other diseases of the musculoskeletal system and connective tissue     History of osteopenia    Personal history of other endocrine, nutritional and metabolic disease     History of hypothyroidism    Raynaud's disease without gangrene 08/14/2023    Rheumatoid arthritis, unspecified (CMS/[facility])     Rheumatoid arthritis    Shortness of breath on exertion 08/21/2023       Past Surgical History:   Procedure Laterality Date    BI US GUIDED BREAST LOCALIZATION AND BIOPSY RIGHT Right 7/18/2019    BI US GUIDED BREAST LOCALIZATION AND BIOPSY RIGHT [facility]    OTHER SURGICAL HISTORY  12/13/2022    Hysterectomy    OTHER SURGICAL HISTORY  12/13/2022    Bladder surgery       Allergies   Allergen Reactions    Latex, Natural Rubber Other    Adhesive Tape-Silicones Other     skin degeneration with tape        reports that she has quit smoking. Her  smoking use included cigarettes. She has never used smokeless tobacco.    Family History   Problem Relation Name Age of Onset    Diabetes Mother      Heart disease Mother      Arthritis Mother      COPD Mother      Hypertension Mother      Thyroid cancer Father      Thyroid cancer Sister      Diabetes Brother      Hepatitis Daughter      Other (partial thyriodectomy) Daughter      Other (hysterectomy) Daughter      Other (gastric bypass) Son         Patient's Medications   New Prescriptions    No medications on file   Previous Medications    ACETAMINOPHEN (TYLENOL) 500 MG TABLET    Take 2 tablets (1,000 mg) by mouth every 6 hours if needed.    ASCORBIC ACID (VITAMIN C) 500 MG TABLET    Take 1 tablet (500 mg) by mouth once daily.    ASPIRIN 81 MG CHEWABLE TABLET    Chew 1 tablet (81 mg) once daily.    B COMPLEX VITAMINS CAPSULE    Take 1 capsule by mouth once daily.    BUDESONIDE-FORMOTEROL (SYMBICORT) 160-4.5 MCG/ACTUATION INHALER    Inhale 2 puffs twice a day.    CALCIUM CARBONATE (OSCAL) 500 MG CALCIUM (1,250 MG) TABLET    Take 1 tablet (1,250 mg) by mouth once daily. For 30 days    DICLOFENAC (VOLTAREN) 75 MG EC TABLET    Take 1 tablet (75 mg) by mouth 2 times a day.    DILTIAZEM (CARDIZEM) 60 MG IMMEDIATE RELEASE TABLET    Take 1 tablet (60 mg) by mouth 2 times a day. For 30 days    FOLIC ACID (FOLVITE) 0.2 MG SPLIT TABLET    Take 2 half tablet (0.4 mg) by mouth once daily.    FOLIC ACID/MULTIVIT,IRON, (ONE DAILY FOR WOMEN ORAL)    Take 1 tablet by mouth once daily.    GABAPENTIN (NEURONTIN) 300 MG CAPSULE    Take 1 capsule (300 mg) by mouth 3 times a day.    LEVOTHYROXINE (SYNTHROID, LEVOXYL) 112 MCG TABLET    Take 1 tablet (112 mcg) by mouth once daily. Skip tablet on Sundays    LOSARTAN (COZAAR) 100 MG TABLET    Take by mouth.    METHOTREXATE (TREXALL) 2.5 MG TABLET    take 8 tablets by mouth per week as directed    OMEPRAZOLE (PRILOSEC) 40 MG DR CAPSULE    Take 1 capsule (40 mg) by mouth once daily in  the morning. Take before meals.    PLAQUENIL 200 MG TABLET    Take 1 tablet (200 mg) by mouth once daily.    ROSUVASTATIN (CRESTOR) 10 MG TABLET    Take 1 tablet (10 mg) by mouth once daily.    TRAZODONE (DESYREL) 50 MG TABLET    Take 1 tablet (50 mg) by mouth once daily at bedtime.    TRELEGY ELLIPTA 100-62.5-25 MCG BLISTER WITH DEVICE    Inhale 1 puff once daily.   Modified Medications    No medications on file   Discontinued Medications    CHOLECALCIFEROL (VITAMIN D-3) 50 MCG (2000 UT) TABLET    Take 2 tablets (100 mcg) by mouth once daily.    DOCUSATE SODIUM (COLACE) 100 MG CAPSULE    Take 1 capsule (100 mg) by mouth twice a day.    ONDANSETRON ODT (ZOFRAN-ODT) 4 MG DISINTEGRATING TABLET    Take 1 tablet (4 mg) by mouth every 6 hours if needed for nausea.       Objective   Physical Exam  General: Patient in no acute distress   HEENT: Atraumatic normocephalic.  Neck: Supple, jugular venous pressure within normal limit.  No bruits  Lungs: Clear to auscultation bilaterally  Cardiovascular: Regular rate and rhythm, normal heart sounds, no murmurs rubs or gallops  Abdomen: Soft nontender nondistended.  Normal bowel sounds.  Extremities: Warm to touch, no edema.    Lab Review   Lab on 10/12/2023   Component Date Value    WBC 10/12/2023 6.1     nRBC 10/12/2023 0.0     RBC 10/12/2023 4.21     Hemoglobin 10/12/2023 12.8     Hematocrit 10/12/2023 40.7     MCV 10/12/2023 97     MCH 10/12/2023 30.4     MCHC 10/12/2023 31.4 (L)     RDW 10/12/2023 13.8     Platelets 10/12/2023 277     MPV 10/12/2023 11.2     Glucose 10/12/2023 52 (L)     Sodium 10/12/2023 141     Potassium 10/12/2023 4.8     Chloride 10/12/2023 105     Bicarbonate 10/12/2023 27     Anion Gap 10/12/2023 14     Urea Nitrogen 10/12/2023 17     Creatinine 10/12/2023 0.59     eGFR 10/12/2023 >90     Calcium 10/12/2023 9.4     Albumin 10/12/2023 4.4     Alkaline Phosphatase 10/12/2023 62     Total Protein 10/12/2023 6.7     AST 10/12/2023 17     Bilirubin, Total  10/12/2023 0.5     ALT 10/12/2023 15     C-Reactive Protein 10/12/2023 0.18     Sedimentation Rate 10/12/2023 3     Thyroid Stimulating Horm* 10/12/2023 1.18         Assessment/Plan   Patient Active Problem List   Diagnosis    Female bladder prolapse    Angina pectoris (CMS/HCC)    Chest pain    Arthritis    Osteoarthritis    Benign essential hypertension    Breast lump    Cervical lesion    Cystocele and rectocele with complete uterovaginal prolapse    Gastroesophageal reflux disease without esophagitis    Female stress incontinence    Fracture of humerus    Generalized osteoarthritis    Impingement syndrome of right shoulder    Knee pain    Myalgia    Nausea and vomiting    Pain in joint, hand    Postmenopausal bleeding    Raynaud's disease without gangrene    Right hip pain    Impingement of right shoulder    Shoulder injury    Right shoulder pain    Right wrist pain    Scleroderma (CMS/HCC)    Systemic sclerosis (CMS/HCC)    Dyspnea    Shortness of breath on exertion    Third degree hemorrhoids    Trigger finger, acquired    Urinary incontinence in female    Urinary retention with incomplete bladder emptying    Vaginal atrophy    Cyst of thyroid    Papillary microcarcinoma of thyroid (CMS/HCC)    Hypocalcemia    COPD (chronic obstructive pulmonary disease) with chronic bronchitis    Degeneration of lumbar or lumbosacral intervertebral disc    H/O total thyroidectomy    Hyperlipidemia due to dietary fat intake    Insomnia    Liver cyst    Lupus (CMS/HCC)    Osteoporosis without current pathological fracture      This is a very pleasant 68-year-old female self-referred to my office for evaluation of episodes of chest pains. Patient had history of hypertension and scleroderma. Patient history of remote smoking. Had in April hospitalization for chest pain with negative troponin. Cardiac catheterization in 2022 showed no major coronary disease. Patient returns to my office for follow-up. Doing overall good. She still  complaining some shortness of breath with moderate activity. Very infrequent episodes of chest tightness.  Patient denies palpitations dizziness or lightheadedness.  No syncope.  At this point we will check 2D echo to make sure she does not have any significant pulmonary hypertension as she has lupus.  She has also mild aortic regurgitation a year and a half ago so we will repeat another 2D echo for follow-up.  Will check lipid panel to make sure her lipids at target.  Otherwise we will see her in 6 months.      Rudi Richardson MD

## 2023-11-30 ENCOUNTER — LAB (OUTPATIENT)
Dept: LAB | Facility: LAB | Age: 68
End: 2023-11-30
Payer: MEDICARE

## 2023-11-30 DIAGNOSIS — E78.2 MIXED HYPERLIPIDEMIA: ICD-10-CM

## 2023-11-30 LAB
CHOLEST SERPL-MCNC: 128 MG/DL (ref 0–199)
CHOLESTEROL/HDL RATIO: 2.3
HDLC SERPL-MCNC: 55.8 MG/DL
LDLC SERPL CALC-MCNC: 58 MG/DL
NON HDL CHOLESTEROL: 72 MG/DL (ref 0–149)
TRIGL SERPL-MCNC: 69 MG/DL (ref 0–149)
VLDL: 14 MG/DL (ref 0–40)

## 2023-11-30 PROCEDURE — 36415 COLL VENOUS BLD VENIPUNCTURE: CPT

## 2023-11-30 PROCEDURE — 80061 LIPID PANEL: CPT

## 2023-12-04 DIAGNOSIS — R11.2 NAUSEA AND VOMITING, UNSPECIFIED VOMITING TYPE: Primary | ICD-10-CM

## 2023-12-04 RX ORDER — ONDANSETRON 4 MG/1
4 TABLET, ORALLY DISINTEGRATING ORAL EVERY 6 HOURS PRN
Qty: 20 TABLET | Refills: 0 | Status: SHIPPED | OUTPATIENT
Start: 2023-12-04 | End: 2024-04-08 | Stop reason: SDUPTHER

## 2023-12-07 ENCOUNTER — HOSPITAL ENCOUNTER (OUTPATIENT)
Dept: CARDIOLOGY | Facility: HOSPITAL | Age: 68
Discharge: HOME | End: 2023-12-07
Payer: MEDICARE

## 2023-12-07 DIAGNOSIS — R06.02 SOB (SHORTNESS OF BREATH): ICD-10-CM

## 2023-12-07 PROCEDURE — 93306 TTE W/DOPPLER COMPLETE: CPT | Performed by: INTERNAL MEDICINE

## 2023-12-07 PROCEDURE — 93306 TTE W/DOPPLER COMPLETE: CPT

## 2023-12-09 LAB
AORTIC VALVE PEAK VELOCITY: 1.43
AV PEAK GRADIENT: 8.2
AVA (PEAK VEL): 1.59
EJECTION FRACTION APICAL 4 CHAMBER: 51.1
LEFT ATRIUM VOLUME AREA LENGTH INDEX BSA: 13.1
LEFT VENTRICLE INTERNAL DIMENSION DIASTOLE: 3.36 (ref 3.5–6)
LEFT VENTRICULAR OUTFLOW TRACT DIAMETER: 1.8
MITRAL VALVE E/A RATIO: 0.95
MITRAL VALVE E/E' RATIO: 11.8
RIGHT VENTRICLE FREE WALL PEAK S': 8.05
RIGHT VENTRICLE PEAK SYSTOLIC PRESSURE: 23.8
TRICUSPID ANNULAR PLANE SYSTOLIC EXCURSION: 1.3

## 2023-12-11 ENCOUNTER — OFFICE VISIT (OUTPATIENT)
Dept: ORTHOPEDIC SURGERY | Facility: CLINIC | Age: 68
End: 2023-12-11
Payer: COMMERCIAL

## 2023-12-11 DIAGNOSIS — M25.811 SHOULDER IMPINGEMENT, RIGHT: Primary | ICD-10-CM

## 2023-12-11 PROCEDURE — 99213 OFFICE O/P EST LOW 20 MIN: CPT | Performed by: ORTHOPAEDIC SURGERY

## 2023-12-11 ASSESSMENT — ENCOUNTER SYMPTOMS
TROUBLE SWALLOWING: 0
FATIGUE: 0
WHEEZING: 0
SHORTNESS OF BREATH: 0
RHINORRHEA: 0
FEVER: 0
CHILLS: 0

## 2023-12-11 ASSESSMENT — PAIN SCALES - GENERAL: PAINLEVEL_OUTOF10: 2

## 2023-12-11 ASSESSMENT — PAIN - FUNCTIONAL ASSESSMENT: PAIN_FUNCTIONAL_ASSESSMENT: 0-10

## 2023-12-11 NOTE — PROGRESS NOTES
Reason for Appointment  Mild R shoulder pain    History of Present Illness  Patient is a 68 y.o. female here today for a [facility] case follow-up evaluation of mild right shoulder pain.  She is at baseline in terms of pain and motion today.  She has had subacromial injection in the past that gave her good relief.  She is able to function, just some mild aching at times with certain activity but she does modify her activity and watches what she does with that arm.  No recent injuries or falls.  She continues to work on motion and stretching.  No other changes in her past medical history, allergies, or medications.    Past Medical History:   Diagnosis Date    Angina pectoris (CMS/[facility]) 08/21/2023    Benign essential hypertension 08/21/2023    Cervicalgia     Neck pain    Chest pain 08/21/2023    Hyperlipidemia due to dietary fat intake 08/14/2023    Other conditions influencing health status     Osteoarthritis    Papillary microcarcinoma of thyroid (CMS/HCC) 10/12/2023    Personal history of other diseases of the digestive system     History of esophageal reflux    Personal history of other diseases of the musculoskeletal system and connective tissue     History of osteopenia    Personal history of other endocrine, nutritional and metabolic disease     History of hypothyroidism    Raynaud's disease without gangrene 08/14/2023    Rheumatoid arthritis, unspecified (CMS/[facility])     Rheumatoid arthritis    Shortness of breath on exertion 08/21/2023       Past Surgical History:   Procedure Laterality Date    BI US GUIDED BREAST LOCALIZATION AND BIOPSY RIGHT Right 7/18/2019    BI US GUIDED BREAST LOCALIZATION AND BIOPSY RIGHT [facility]    OTHER SURGICAL HISTORY  12/13/2022    Hysterectomy    OTHER SURGICAL HISTORY  12/13/2022    Bladder surgery       Medication Documentation Review Audit       Reviewed by Joi Astorga PA-C (Physician Assistant) on 12/11/23 at 1021      Medication Order Taking? Sig Documenting Provider  Last Dose Status   acetaminophen (Tylenol) 500 mg tablet [id number] Yes Take 2 tablets (1,000 mg) by mouth every 6 hours if needed. Historical MD Reddy Taking Active   ascorbic acid (Vitamin C) 500 mg tablet [id number] Yes Take 1 tablet (500 mg) by mouth once daily. Gelacio Blakely MD Taking Active   aspirin 81 mg chewable tablet [id number] Yes Chew 1 tablet (81 mg) once daily. Gelacio Blakely MD Taking Active   b complex vitamins capsule [id number] Yes Take 1 capsule by mouth once daily. Gelacio Blakely MD Taking Active   budesonide-formoteroL (Symbicort) 160-4.5 mcg/actuation inhaler [id number] Yes Inhale 2 puffs twice a day. Historical MD Reddy Taking Active   calcium carbonate (Oscal) 500 mg calcium (1,250 mg) tablet [id number] Yes Take 1 tablet (1,250 mg) by mouth once daily. For 30 days Gelacio Blakely MD Taking Active   diclofenac (Voltaren) 75 mg EC tablet [id number] Yes Take 1 tablet (75 mg) by mouth 2 times a day. Genesis Gudino MD Taking Active   dilTIAZem (Cardizem) 60 mg immediate release tablet [id number] Yes Take 1 tablet (60 mg) by mouth 2 times a day. For 30 days Gelacio Blakely MD Taking Active   folic acid (Folvite) 0.2 MG split tablet [id number] Yes Take 2 half tablet (0.4 mg) by mouth once daily. Gelacio Blakely MD Taking Active   folic acid/multivit,iron, (ONE DAILY FOR WOMEN ORAL) [id number] Yes Take 1 tablet by mouth once daily. Gelacio Blakely MD Taking Active   gabapentin (Neurontin) 300 mg capsule [id number] Yes Take 1 capsule (300 mg) by mouth 3 times a day. Historical MD Reddy Taking Active   levothyroxine (Synthroid, Levoxyl) 112 mcg tablet [id number] Yes Take 1 tablet (112 mcg) by mouth once daily. Skip tablet on Sundays Dori Khan MD Taking Active   losartan (Cozaar) 100 mg tablet [id number] Yes Take by mouth. Gelacio Blakely MD Taking Active   methotrexate (Trexall) 2.5 mg tablet [id number] Yes take 8 tablets by mouth per week as  directed Genesis Gudino MD Taking Active   omeprazole (PriLOSEC) 40 mg DR capsule [id number] Yes Take 1 capsule (40 mg) by mouth once daily in the morning. Take before meals. Historical Provider, MD Taking Active   ondansetron ODT (Zofran-ODT) 4 mg disintegrating tablet [id number] Yes DISSOLVE 1 TABLET IN MOUTH EVERY 6 HOURS AS NEEDED for nausea Genesis Gudino MD Taking Active   PlaqueniL 200 mg tablet [id number] Yes Take 1 tablet (200 mg) by mouth once daily. Historical Provider, MD Taking Active   rosuvastatin (Crestor) 10 mg tablet [id number] Yes Take 1 tablet (10 mg) by mouth once daily. Historical Provider, MD Taking Active   traZODone (Desyrel) 50 mg tablet [id number] Yes Take 1 tablet (50 mg) by mouth once daily at bedtime. Historical Provider, MD Taking Active   Trelegy Ellipta 100-62.5-25 mcg blister with device [id number] Yes Inhale 1 puff once daily. Historical Provider, MD Taking Active                    Allergies   Allergen Reactions    Latex, Natural Rubber Other    Adhesive Tape-Silicones Other     skin degeneration with tape       Review of Systems   Constitutional:  Negative for chills, fatigue and fever.   HENT:  Negative for rhinorrhea and trouble swallowing.    Respiratory:  Negative for shortness of breath and wheezing.    Cardiovascular:  Negative for chest pain and leg swelling.   Skin:  Negative for pallor and rash.     Exam   On exam is lacking about 20 degrees of full active forward flexion on the right compared to the left side.  Deltoid is functional, fairly good cuff strength with resisted external rotation.  Mildly positive impingement signs on the right.  Good pulses and sensation in the upper extremity.    Assessment   RIGHT SHOULDER IMPINGEMENT    Plan   She will continue to work on stretching and motion, she understands modification of activity as needed.  We did discuss a possible injection in the future especially in the springtime when she needs to do more yard work to keep  her functional.  She can follow-up with us in the spring unless she has any new issues.    Written by Joi Astorga PA-C

## 2023-12-26 ENCOUNTER — OFFICE VISIT (OUTPATIENT)
Dept: ORTHOPEDIC SURGERY | Facility: CLINIC | Age: 68
End: 2023-12-26
Payer: MEDICARE

## 2023-12-26 ENCOUNTER — TREATMENT (OUTPATIENT)
Dept: OCCUPATIONAL THERAPY | Facility: CLINIC | Age: 68
End: 2023-12-26
Payer: MEDICARE

## 2023-12-26 DIAGNOSIS — S63.659A MP (METACARPOPHALANGEAL) JOINT SPRAIN, INITIAL ENCOUNTER: Primary | ICD-10-CM

## 2023-12-26 PROBLEM — S63.655A: Status: ACTIVE | Noted: 2023-12-26

## 2023-12-26 PROCEDURE — 1036F TOBACCO NON-USER: CPT | Performed by: ORTHOPAEDIC SURGERY

## 2023-12-26 PROCEDURE — 99213 OFFICE O/P EST LOW 20 MIN: CPT | Performed by: ORTHOPAEDIC SURGERY

## 2023-12-26 PROCEDURE — 1160F RVW MEDS BY RX/DR IN RCRD: CPT | Performed by: ORTHOPAEDIC SURGERY

## 2023-12-26 PROCEDURE — 1159F MED LIST DOCD IN RCRD: CPT | Performed by: ORTHOPAEDIC SURGERY

## 2023-12-26 PROCEDURE — L3913 HFO W/O JOINTS CF: HCPCS | Performed by: OCCUPATIONAL THERAPIST

## 2023-12-26 PROCEDURE — 1126F AMNT PAIN NOTED NONE PRSNT: CPT | Performed by: ORTHOPAEDIC SURGERY

## 2023-12-26 ASSESSMENT — ENCOUNTER SYMPTOMS
FATIGUE: 0
CHILLS: 0
SHORTNESS OF BREATH: 0
ARTHRALGIAS: 1
WHEEZING: 0
FEVER: 0

## 2023-12-26 ASSESSMENT — PAIN - FUNCTIONAL ASSESSMENT: PAIN_FUNCTIONAL_ASSESSMENT: 0-10

## 2023-12-26 ASSESSMENT — PAIN SCALES - GENERAL: PAINLEVEL_OUTOF10: 4

## 2023-12-26 NOTE — PROGRESS NOTES
"Rehab Walk-in Note    Patient Name: Radha Restrepo  MRN: [id number]  Today's Date: 12/26/2023    Referring Physician: Aniket    Subjective     \"I fell while walking my dog and really did a number on that hand.\"  Current Problem: L RF and SF MP joint sprains   History of Current Problem: Patient reports falling while walking her dog on 11/22/23 resulting in L RF & SF MP joint sprains.       Objective   General Visit Information: Patient is pleasant and cooperative.      Clinical Presentation: general stiffness is noted of L hand  Splint Type: hand based L RF & SF MP flexion with position of function of IP joints.   Treatment order: hand based intrinsic + of L RF & SF  Precautions:splint to be removed for AROM exercises.        Pain:9/10     Home Living: Patient lives with her two adult sons.      Prior Level of Function: Patient reports being independent with all ADL's prior to fall.        Assessment/Plan   Therapist fabricated/fitted L hand based intrinsic + splint for patient's L RF & SF.  Patient instructed patient in proper don/doff tech, wear recommendations, importance of skin monitoring, and splint care; written instruction provided.   Patient to wear splint during activity as instructed patient physician/therapist.  Plan: Discharge with follow-up as needed.   "

## 2023-12-26 NOTE — PROGRESS NOTES
Reason for Appointment  Chief Complaint   Patient presents with    Left Hand - Follow-up     XR     History of Present Illness  Patient is a 68 y.o. female here today for evaluation of a left hand injury 5 weeks ago. She tripped over her dog and fell forward and caught a doorframe when she fell. She was seen at the ED 12/1/23 and X-rays were negative for fracture but show moderate CMC arthritis and scattered distal joint DJD per report. She may have hyperextended the ring and small fingers when she caught the doorframe. Today, she complains of pain between the ring and small fingers. No other updates to PMH, allergies, or medications.     Past Medical History:   Diagnosis Date    Angina pectoris (CMS/HCC) 08/21/2023    Benign essential hypertension 08/21/2023    Cervicalgia     Neck pain    Chest pain 08/21/2023    Hyperlipidemia due to dietary fat intake 08/14/2023    Other conditions influencing health status     Osteoarthritis    Papillary microcarcinoma of thyroid (CMS/HCC) 10/12/2023    Personal history of other diseases of the digestive system     History of esophageal reflux    Personal history of other diseases of the musculoskeletal system and connective tissue     History of osteopenia    Personal history of other endocrine, nutritional and metabolic disease     History of hypothyroidism    Raynaud's disease without gangrene 08/14/2023    Rheumatoid arthritis, unspecified (CMS/HCC)     Rheumatoid arthritis    Shortness of breath on exertion 08/21/2023       Past Surgical History:   Procedure Laterality Date    BI US GUIDED BREAST LOCALIZATION AND BIOPSY RIGHT Right 7/18/2019    BI US GUIDED BREAST LOCALIZATION AND BIOPSY RIGHT [facility]    OTHER SURGICAL HISTORY  12/13/2022    Hysterectomy    OTHER SURGICAL HISTORY  12/13/2022    Bladder surgery       Medication Documentation Review Audit       Reviewed by Giovanna Aleman MA (Medical Assistant) on 12/26/23 at 0808      Medication Order Taking?  Sig Documenting Provider Last Dose Status   acetaminophen (Tylenol) 500 mg tablet [id number] Yes Take 2 tablets (1,000 mg) by mouth every 6 hours if needed. Historical Provider, MD Taking Active   ascorbic acid (Vitamin C) 500 mg tablet [id number] Yes Take 1 tablet (500 mg) by mouth once daily. Historical Provider, MD Taking Active   aspirin 81 mg chewable tablet [id number] Yes Chew 1 tablet (81 mg) once daily. Historical Provider, MD Taking Active   b complex vitamins capsule [id number] Yes Take 1 capsule by mouth once daily. Historical Provider, MD Taking Active   budesonide-formoteroL (Symbicort) 160-4.5 mcg/actuation inhaler [id number] Yes Inhale 2 puffs twice a day. Historical Provider, MD Taking Active   calcium carbonate (Oscal) 500 mg calcium (1,250 mg) tablet [id number] Yes Take 1 tablet (1,250 mg) by mouth once daily. For 30 days Gelacio Blakely MD Taking Active   diclofenac (Voltaren) 75 mg EC tablet [id number] Yes Take 1 tablet (75 mg) by mouth 2 times a day. Genesis Gudino MD Taking Active   dilTIAZem (Cardizem) 60 mg immediate release tablet [id number] Yes Take 1 tablet (60 mg) by mouth 2 times a day. For 30 days Gelacio Blakely MD Taking Active   folic acid (Folvite) 0.2 MG split tablet [id number] Yes Take 2 half tablet (0.4 mg) by mouth once daily. Historical Provider, MD Taking Active   folic acid/multivit,iron, (ONE DAILY FOR WOMEN ORAL) [id number] Yes Take 1 tablet by mouth once daily. Historical MD Reddy Taking Active   gabapentin (Neurontin) 300 mg capsule [id number] Yes Take 1 capsule (300 mg) by mouth 3 times a day. Historical Provider, MD Taking Active   levothyroxine (Synthroid, Levoxyl) 112 mcg tablet [id number] Yes Take 1 tablet (112 mcg) by mouth once daily. Skip tablet on Sundays Dori Khan MD Taking Active   losartan (Cozaar) 100 mg tablet [id number] Yes Take by mouth. Historical MD Reddy Taking Active   methotrexate (Trexall) 2.5 mg tablet [id number] Yes take 8  tablets by mouth per week as directed Genesis Gudino MD Taking Active   omeprazole (PriLOSEC) 40 mg DR capsule [id number] Yes Take 1 capsule (40 mg) by mouth once daily in the morning. Take before meals. Historical Provider, MD Taking Active   ondansetron ODT (Zofran-ODT) 4 mg disintegrating tablet [id number] Yes DISSOLVE 1 TABLET IN MOUTH EVERY 6 HOURS AS NEEDED for nausea Genesis Gudino MD Taking Active   PlaqueniL 200 mg tablet [id number] Yes Take 1 tablet (200 mg) by mouth once daily. Historical Provider, MD Taking Active   rosuvastatin (Crestor) 10 mg tablet [id number] Yes Take 1 tablet (10 mg) by mouth once daily. Historical Provider, MD Taking Active   traZODone (Desyrel) 50 mg tablet [id number] Yes Take 1 tablet (50 mg) by mouth once daily at bedtime. Historical Provider, MD Taking Active   Trelegy Ellipta 100-62.5-25 mcg blister with device [id number] Yes Inhale 1 puff once daily. Historical Provider, MD Taking Active                    Allergies   Allergen Reactions    Latex, Natural Rubber Other    Adhesive Tape-Silicones Other     skin degeneration with tape       Review of Systems   Constitutional:  Negative for chills, fatigue and fever.   Respiratory:  Negative for shortness of breath and wheezing.    Cardiovascular:  Negative for chest pain and leg swelling.   Musculoskeletal:  Positive for arthralgias.   All other systems reviewed and are negative.      Exam   On exam of the left hand, there is no distal radius ulna or scaphoid tenderness, she does have significant CMC arthritis, tender over both the ring and small MP joints, central extensor tendons intact, she does have some tenderness over both radial collateral ligaments, no gross instability    Assessment   Encounter Diagnosis   Name Primary?    MP (metacarpophalangeal) joint sprain, initial encounter Yes     Plan   I will get her into a custom brace today to provide support with lifting and heavy activity. She understands to come out of the  brace to work on regaining ROM. She should wear the brace at night.     I, Rosa Nino, attest that this documentation has been prepared under the direction and in the presence of Praveen Myers MD. By signing below, I, Praveen Myers MD, personally performed the services described in this documentation. All medical record entries made by the scribe were at my direction and in my presence. I have reviewed the chart and agree that the record reflects my personal performance and is accurate and complete. 12/26/23

## 2024-02-05 ENCOUNTER — APPOINTMENT (OUTPATIENT)
Dept: ORTHOPEDIC SURGERY | Facility: CLINIC | Age: 69
End: 2024-02-05
Payer: MEDICARE

## 2024-02-06 ENCOUNTER — OFFICE VISIT (OUTPATIENT)
Dept: ORTHOPEDIC SURGERY | Facility: CLINIC | Age: 69
End: 2024-02-06
Payer: MEDICARE

## 2024-02-06 DIAGNOSIS — S60.222A CONTUSION OF LEFT HAND INCLUDING FINGERS, INITIAL ENCOUNTER: Primary | ICD-10-CM

## 2024-02-06 DIAGNOSIS — S60.00XA CONTUSION OF LEFT HAND INCLUDING FINGERS, INITIAL ENCOUNTER: Primary | ICD-10-CM

## 2024-02-06 PROCEDURE — 1160F RVW MEDS BY RX/DR IN RCRD: CPT | Performed by: ORTHOPAEDIC SURGERY

## 2024-02-06 PROCEDURE — 99213 OFFICE O/P EST LOW 20 MIN: CPT | Performed by: ORTHOPAEDIC SURGERY

## 2024-02-06 PROCEDURE — 1036F TOBACCO NON-USER: CPT | Performed by: ORTHOPAEDIC SURGERY

## 2024-02-06 PROCEDURE — 99213 OFFICE O/P EST LOW 20 MIN: CPT | Mod: 25 | Performed by: ORTHOPAEDIC SURGERY

## 2024-02-06 PROCEDURE — 1159F MED LIST DOCD IN RCRD: CPT | Performed by: ORTHOPAEDIC SURGERY

## 2024-02-06 PROCEDURE — 1126F AMNT PAIN NOTED NONE PRSNT: CPT | Performed by: ORTHOPAEDIC SURGERY

## 2024-02-06 ASSESSMENT — PAIN SCALES - GENERAL: PAINLEVEL_OUTOF10: 2

## 2024-02-06 ASSESSMENT — ENCOUNTER SYMPTOMS
SHORTNESS OF BREATH: 0
WHEEZING: 0
JOINT SWELLING: 0
FEVER: 0
CHILLS: 0
EYE DISCHARGE: 0
TROUBLE SWALLOWING: 0
COLOR CHANGE: 0

## 2024-02-06 ASSESSMENT — PAIN - FUNCTIONAL ASSESSMENT: PAIN_FUNCTIONAL_ASSESSMENT: 0-10

## 2024-02-06 NOTE — PROGRESS NOTES
Reason for Appointment  Improved L hand pain    History of Present Illness  Patient is a 68 y.o. female here today for follow-up evaluation of her left hand.  She fell and caught her hand on a door frame 3 months ago.  Initial x-rays were negative for fracture.  She had pain over the MP joints of the ring and small fingers.  Pain has improved and she has regained full motion in the digits.  No other changes in her past medical history, allergies, or medications..     Past Medical History:   Diagnosis Date    Angina pectoris (CMS/[facility]) 08/21/2023    Benign essential hypertension 08/21/2023    Cervicalgia     Neck pain    Chest pain 08/21/2023    Hyperlipidemia due to dietary fat intake 08/14/2023    Other conditions influencing health status     Osteoarthritis    Papillary microcarcinoma of thyroid (CMS/HCC) 10/12/2023    Personal history of other diseases of the digestive system     History of esophageal reflux    Personal history of other diseases of the musculoskeletal system and connective tissue     History of osteopenia    Personal history of other endocrine, nutritional and metabolic disease     History of hypothyroidism    Raynaud's disease without gangrene 08/14/2023    Rheumatoid arthritis, unspecified (CMS/[facility])     Rheumatoid arthritis    Shortness of breath on exertion 08/21/2023       Past Surgical History:   Procedure Laterality Date    BI US GUIDED BREAST LOCALIZATION AND BIOPSY RIGHT Right 7/18/2019    BI US GUIDED BREAST LOCALIZATION AND BIOPSY RIGHT [facility]    OTHER SURGICAL HISTORY  12/13/2022    Hysterectomy    OTHER SURGICAL HISTORY  12/13/2022    Bladder surgery       Medication Documentation Review Audit       Reviewed by Joi Astorga PA-C (Physician Assistant) on 02/06/24 at 0955      Medication Order Taking? Sig Documenting Provider Last Dose Status   acetaminophen (Tylenol) 500 mg tablet [id number] Yes Take 2 tablets (1,000 mg) by mouth every 6 hours if needed. Historical  Provider, MD Taking Active   ascorbic acid (Vitamin C) 500 mg tablet [id number] Yes Take 1 tablet (500 mg) by mouth once daily. Historical Provider, MD Taking Active   aspirin 81 mg chewable tablet [id number] Yes Chew 1 tablet (81 mg) once daily. Gelacio Blakely MD Taking Active   b complex vitamins capsule [id number] Yes Take 1 capsule by mouth once daily. Gelacio Blakely MD Taking Active   budesonide-formoteroL (Symbicort) 160-4.5 mcg/actuation inhaler [id number] Yes Inhale 2 puffs twice a day. Historical MD Reddy Taking Active   calcium carbonate (Oscal) 500 mg calcium (1,250 mg) tablet [id number] Yes Take 1 tablet (1,250 mg) by mouth once daily. For 30 days Gelacio Blakely MD Taking Active   diclofenac (Voltaren) 75 mg EC tablet [id number] Yes Take 1 tablet (75 mg) by mouth 2 times a day. Genesis Gudino MD Taking Active   dilTIAZem (Cardizem) 60 mg immediate release tablet [id number] Yes Take 1 tablet (60 mg) by mouth 2 times a day. For 30 days Gelacio Blakely MD Taking Active   folic acid (Folvite) 0.2 MG split tablet [id number] Yes Take 2 half tablet (0.4 mg) by mouth once daily. Historical MD Reddy Taking Active   folic acid/multivit,iron, (ONE DAILY FOR WOMEN ORAL) [id number] Yes Take 1 tablet by mouth once daily. Gelacio Blakely MD Taking Active   gabapentin (Neurontin) 300 mg capsule [id number] Yes Take 1 capsule (300 mg) by mouth 3 times a day. Gelacio Blakely MD Taking Active   levothyroxine (Synthroid, Levoxyl) 112 mcg tablet [id number] Yes Take 1 tablet (112 mcg) by mouth once daily. Skip tablet on Sundays Dori Khan MD Taking Active   losartan (Cozaar) 100 mg tablet [id number] Yes Take by mouth. Gelacio Blakely MD Taking Active   methotrexate (Trexall) 2.5 mg tablet [id number] Yes take 8 tablets by mouth per week as directed Genesis Gudino MD Taking Active   omeprazole (PriLOSEC) 40 mg DR capsule [id number] Yes Take 1 capsule (40 mg) by mouth once daily  in the morning. Take before meals. Historical Provider, MD Taking Active   ondansetron ODT (Zofran-ODT) 4 mg disintegrating tablet [id number] Yes DISSOLVE 1 TABLET IN MOUTH EVERY 6 HOURS AS NEEDED for nausea Genesis Gudino MD Taking Active   PlaqueniL 200 mg tablet [id number] Yes Take 1 tablet (200 mg) by mouth once daily. Historical Provider, MD Taking Active   rosuvastatin (Crestor) 10 mg tablet [id number] Yes Take 1 tablet (10 mg) by mouth once daily. Historical Provider, MD Taking Active   traZODone (Desyrel) 50 mg tablet [id number] Yes Take 1 tablet (50 mg) by mouth once daily at bedtime. Historical Provider, MD Taking Active   Trelegy Ellipta 100-62.5-25 mcg blister with device [id number] Yes Inhale 1 puff once daily. Historical Provider, MD Taking Active                    Allergies   Allergen Reactions    Latex, Natural Rubber Other    Adhesive Tape-Silicones Other     skin degeneration with tape       Review of Systems   Constitutional:  Negative for chills and fever.   HENT:  Negative for nosebleeds and trouble swallowing.    Eyes:  Negative for discharge.   Respiratory:  Negative for shortness of breath and wheezing.    Cardiovascular:  Negative for chest pain.   Musculoskeletal:  Negative for joint swelling.   Skin:  Negative for color change and pallor.   All other systems reviewed and are negative.    Exam   On exam left hand shows no severe swelling, she has full flexion and extension of the digits.  Minimal tenderness over the MP joints today.  Central slips are intact and no severe collateral ligament tenderness.  Good pulses and sensation in the upper extremity.    Assessment   Left hand contusion    Plan   Symptoms should continue to improve.  No further intervention is warranted.  She can follow-up with us as needed.    Written by Joi Astorga PA-C

## 2024-02-21 DIAGNOSIS — K21.9 GASTROESOPHAGEAL REFLUX DISEASE WITHOUT ESOPHAGITIS: ICD-10-CM

## 2024-02-21 DIAGNOSIS — M34.9 SYSTEMIC SCLEROSIS (MULTI): Primary | ICD-10-CM

## 2024-02-21 RX ORDER — OMEPRAZOLE 40 MG/1
40 CAPSULE, DELAYED RELEASE ORAL DAILY
Qty: 90 CAPSULE | Refills: 1 | Status: SHIPPED | OUTPATIENT
Start: 2024-02-21 | End: 2024-04-08 | Stop reason: SDUPTHER

## 2024-04-08 ENCOUNTER — LAB (OUTPATIENT)
Dept: LAB | Facility: LAB | Age: 69
End: 2024-04-08
Payer: MEDICARE

## 2024-04-08 ENCOUNTER — OFFICE VISIT (OUTPATIENT)
Dept: RHEUMATOLOGY | Facility: CLINIC | Age: 69
End: 2024-04-08
Payer: MEDICARE

## 2024-04-08 VITALS
OXYGEN SATURATION: 98 % | WEIGHT: 131.2 LBS | BODY MASS INDEX: 25.76 KG/M2 | SYSTOLIC BLOOD PRESSURE: 105 MMHG | HEART RATE: 65 BPM | DIASTOLIC BLOOD PRESSURE: 70 MMHG | HEIGHT: 60 IN

## 2024-04-08 DIAGNOSIS — K21.9 GASTROESOPHAGEAL REFLUX DISEASE WITHOUT ESOPHAGITIS: ICD-10-CM

## 2024-04-08 DIAGNOSIS — M81.8 OTHER OSTEOPOROSIS WITHOUT CURRENT PATHOLOGICAL FRACTURE: ICD-10-CM

## 2024-04-08 DIAGNOSIS — Z79.899 ENCOUNTER FOR LONG-TERM (CURRENT) USE OF HIGH-RISK MEDICATION: ICD-10-CM

## 2024-04-08 DIAGNOSIS — M34.9 SYSTEMIC SCLEROSIS (MULTI): ICD-10-CM

## 2024-04-08 DIAGNOSIS — M34.9 SYSTEMIC SCLEROSIS (MULTI): Primary | ICD-10-CM

## 2024-04-08 DIAGNOSIS — R11.2 NAUSEA AND VOMITING, UNSPECIFIED VOMITING TYPE: ICD-10-CM

## 2024-04-08 LAB
ALBUMIN SERPL BCP-MCNC: 4.1 G/DL (ref 3.4–5)
ALP SERPL-CCNC: 55 U/L (ref 33–136)
ALT SERPL W P-5'-P-CCNC: 22 U/L (ref 7–45)
ANION GAP SERPL CALC-SCNC: 14 MMOL/L (ref 10–20)
AST SERPL W P-5'-P-CCNC: 19 U/L (ref 9–39)
BILIRUB SERPL-MCNC: 0.4 MG/DL (ref 0–1.2)
BUN SERPL-MCNC: 16 MG/DL (ref 6–23)
CALCIUM SERPL-MCNC: 9 MG/DL (ref 8.6–10.3)
CHLORIDE SERPL-SCNC: 103 MMOL/L (ref 98–107)
CO2 SERPL-SCNC: 30 MMOL/L (ref 21–32)
CREAT SERPL-MCNC: 0.62 MG/DL (ref 0.5–1.05)
CRP SERPL-MCNC: <0.1 MG/DL
EGFRCR SERPLBLD CKD-EPI 2021: >90 ML/MIN/1.73M*2
ERYTHROCYTE [DISTWIDTH] IN BLOOD BY AUTOMATED COUNT: 13.5 % (ref 11.5–14.5)
ERYTHROCYTE [SEDIMENTATION RATE] IN BLOOD BY WESTERGREN METHOD: 9 MM/H (ref 0–30)
GLUCOSE SERPL-MCNC: 79 MG/DL (ref 74–99)
HCT VFR BLD AUTO: 41.5 % (ref 36–46)
HGB BLD-MCNC: 12.9 G/DL (ref 12–16)
MCH RBC QN AUTO: 30.3 PG (ref 26–34)
MCHC RBC AUTO-ENTMCNC: 31.1 G/DL (ref 32–36)
MCV RBC AUTO: 97 FL (ref 80–100)
NRBC BLD-RTO: 0 /100 WBCS (ref 0–0)
PLATELET # BLD AUTO: 290 X10*3/UL (ref 150–450)
POTASSIUM SERPL-SCNC: 4.6 MMOL/L (ref 3.5–5.3)
PROT SERPL-MCNC: 6.4 G/DL (ref 6.4–8.2)
RBC # BLD AUTO: 4.26 X10*6/UL (ref 4–5.2)
SODIUM SERPL-SCNC: 142 MMOL/L (ref 136–145)
WBC # BLD AUTO: 5.6 X10*3/UL (ref 4.4–11.3)

## 2024-04-08 PROCEDURE — 3074F SYST BP LT 130 MM HG: CPT | Performed by: INTERNAL MEDICINE

## 2024-04-08 PROCEDURE — 80053 COMPREHEN METABOLIC PANEL: CPT

## 2024-04-08 PROCEDURE — 36415 COLL VENOUS BLD VENIPUNCTURE: CPT

## 2024-04-08 PROCEDURE — 1160F RVW MEDS BY RX/DR IN RCRD: CPT | Performed by: INTERNAL MEDICINE

## 2024-04-08 PROCEDURE — 1159F MED LIST DOCD IN RCRD: CPT | Performed by: INTERNAL MEDICINE

## 2024-04-08 PROCEDURE — 99214 OFFICE O/P EST MOD 30 MIN: CPT | Performed by: INTERNAL MEDICINE

## 2024-04-08 PROCEDURE — 86140 C-REACTIVE PROTEIN: CPT

## 2024-04-08 PROCEDURE — 85027 COMPLETE CBC AUTOMATED: CPT

## 2024-04-08 PROCEDURE — 85652 RBC SED RATE AUTOMATED: CPT

## 2024-04-08 PROCEDURE — 3078F DIAST BP <80 MM HG: CPT | Performed by: INTERNAL MEDICINE

## 2024-04-08 RX ORDER — HYDROXYCHLOROQUINE SULFATE 200 MG/1
200 TABLET, FILM COATED ORAL DAILY
Qty: 90 TABLET | Refills: 3 | Status: SHIPPED | OUTPATIENT
Start: 2024-04-08

## 2024-04-08 RX ORDER — OMEPRAZOLE 40 MG/1
40 CAPSULE, DELAYED RELEASE ORAL DAILY
Qty: 90 CAPSULE | Refills: 1 | Status: SHIPPED | OUTPATIENT
Start: 2024-04-08 | End: 2024-10-05

## 2024-04-08 RX ORDER — ALBUTEROL SULFATE 90 UG/1
2 AEROSOL, METERED RESPIRATORY (INHALATION) EVERY 4 HOURS PRN
COMMUNITY
Start: 2024-03-20

## 2024-04-08 RX ORDER — BIOTIN 1 MG
1000 TABLET ORAL DAILY
COMMUNITY
Start: 2023-02-20

## 2024-04-08 RX ORDER — ONDANSETRON 4 MG/1
4 TABLET, ORALLY DISINTEGRATING ORAL EVERY 6 HOURS PRN
Qty: 20 TABLET | Refills: 0 | Status: SHIPPED | OUTPATIENT
Start: 2024-04-08

## 2024-04-08 RX ORDER — METHOTREXATE 2.5 MG/1
25 TABLET ORAL
Qty: 120 TABLET | Refills: 1 | Status: SHIPPED | OUTPATIENT
Start: 2024-04-14

## 2024-04-08 ASSESSMENT — ENCOUNTER SYMPTOMS
NUMBNESS: 1
FATIGUE: 1
DYSURIA: 0
ADENOPATHY: 0
ARTHRALGIAS: 1

## 2024-04-08 NOTE — PROGRESS NOTES
Subjective   Patient ID: Radha Restrepo is a 69 y.o. female who presents for Follow-up (6 mo fuv).  HPI  Systemic sclerosis with features of Raynaud's, GERD, spinal stenosis, osteopenia.  Also has nonobstructing coronary artery disease, COPD.    She has been more sore than usual.  She is very stiff to move her fingers sometimes an hour and a half.  Her heartburn is okay as long as she takes her omeprazole.  Her spine bothers her a lot with her disc disease.  Her fingers have been really bothering her and is unable to make a fist.  Takes an hour just to close them.    She had fallen in December and had to have Dr. Myers help her with her fourth and fifth digit on the left hand.    She has history of bronchitis with her history of COPD.  She has a CT scan May 15.  Follows with pulmonology.    Review of Systems   Constitutional:  Positive for fatigue.   HENT:  Negative for congestion.    Respiratory:          Copd   Gastrointestinal:         Gets occasional reflux and nausea and will take Zofran.   Genitourinary:  Negative for dysuria.   Musculoskeletal:  Positive for arthralgias.   Skin:         Hard white balls and thinks it's going to be a zit   Neurological:  Positive for numbness.   Hematological:  Negative for adenopathy.       Objective   Physical Exam  GEN: NAD A&O x3 appropriate affect  HENT:no enlarged glands or thyroid  EYES:  no conjunctival redness, eyelids normal  LYMPH: no cervical lymph nodes  SKIN: no rashes seen on exposed skin  PULSES: +radials  TENDER POINTS: 0/18   MSK:  Squaring at the left CMC and first MCP joint  Hypertrophic changes the DIP and PIP of the bilateral hands  No swelling of the elbows  Anterior rotation bilateral shoulders  No swelling of the knees  No swelling of the ankles  Assessment/Plan       Systemic sclerosis with features of positive anti-SCL 70, Raynaud's, GERD, calcifications.  Also has COPD and nonobstructive coronary artery disease.    -Currently on Plaquenil and  methotrexate.  Will have her do blood work      Osteopenia s last bone density January 17, 2023.  Left total hip -1, left femoral neck -1.4, lumbar spine L1-L2 -1.7.  Discussed that she could do IV Reclast.  We will reevaluate at her next bone density and if there is worsening then we will move forward with this.  Discussed about increasing weightbearing activity.

## 2024-04-11 ENCOUNTER — APPOINTMENT (OUTPATIENT)
Dept: RADIOLOGY | Facility: CLINIC | Age: 69
End: 2024-04-11
Payer: COMMERCIAL

## 2024-04-14 DIAGNOSIS — I20.9 ANGINA PECTORIS (CMS-HCC): ICD-10-CM

## 2024-04-15 ENCOUNTER — APPOINTMENT (OUTPATIENT)
Dept: ENDOCRINOLOGY | Facility: CLINIC | Age: 69
End: 2024-04-15
Payer: MEDICARE

## 2024-04-15 ENCOUNTER — OFFICE VISIT (OUTPATIENT)
Dept: ENDOCRINOLOGY | Facility: CLINIC | Age: 69
End: 2024-04-15
Payer: MEDICARE

## 2024-04-15 VITALS
BODY MASS INDEX: 25.64 KG/M2 | SYSTOLIC BLOOD PRESSURE: 124 MMHG | RESPIRATION RATE: 16 BRPM | WEIGHT: 130.6 LBS | DIASTOLIC BLOOD PRESSURE: 70 MMHG | HEART RATE: 76 BPM | HEIGHT: 60 IN

## 2024-04-15 DIAGNOSIS — C73 PAPILLARY MICROCARCINOMA OF THYROID (MULTI): ICD-10-CM

## 2024-04-15 DIAGNOSIS — E03.9 HYPOTHYROIDISM, UNSPECIFIED TYPE: Primary | ICD-10-CM

## 2024-04-15 PROCEDURE — 3074F SYST BP LT 130 MM HG: CPT | Performed by: INTERNAL MEDICINE

## 2024-04-15 PROCEDURE — 3078F DIAST BP <80 MM HG: CPT | Performed by: INTERNAL MEDICINE

## 2024-04-15 PROCEDURE — 99213 OFFICE O/P EST LOW 20 MIN: CPT | Performed by: INTERNAL MEDICINE

## 2024-04-15 PROCEDURE — 1036F TOBACCO NON-USER: CPT | Performed by: INTERNAL MEDICINE

## 2024-04-15 PROCEDURE — 1159F MED LIST DOCD IN RCRD: CPT | Performed by: INTERNAL MEDICINE

## 2024-04-15 PROCEDURE — 1160F RVW MEDS BY RX/DR IN RCRD: CPT | Performed by: INTERNAL MEDICINE

## 2024-04-15 RX ORDER — DILTIAZEM HYDROCHLORIDE 60 MG/1
60 TABLET, FILM COATED ORAL 2 TIMES DAILY
Qty: 180 TABLET | Refills: 3 | Status: SHIPPED | OUTPATIENT
Start: 2024-04-15 | End: 2025-04-10

## 2024-04-15 ASSESSMENT — ENCOUNTER SYMPTOMS
FEVER: 0
NAUSEA: 0
HEADACHES: 0
FATIGUE: 0
SHORTNESS OF BREATH: 0
CHILLS: 0
COUGH: 0
VOMITING: 0
DIARRHEA: 0
PALPITATIONS: 0

## 2024-04-15 NOTE — TELEPHONE ENCOUNTER
Pharmacy is requesting a refill on behalf of the pt     Requested Prescriptions     Pending Prescriptions Disp Refills    dilTIAZem (Cardizem) 60 mg immediate release tablet [Pharmacy Med Name: dilTIAZem HCl Oral Tablet 60 MG] 180 tablet 3     Sig: Take 1 tablet (60 mg) by mouth 2 times a day.

## 2024-04-15 NOTE — PROGRESS NOTES
Endocrinology: Follow up visit  Subjective   Patient ID: Radha Restrepo is a 69 y.o. female who presents for Hypothyroidism and Goiter.    PCP: Jurgen Friedman,     HPI  Here for follow up   Papillary micro carcinoma s/p total tx.  Surgery march 2023  Last time thyroid levels looked great.     Feels well, back to work.   No neck complaints    Review of Systems   Constitutional:  Negative for chills, fatigue and fever.   Respiratory:  Negative for cough and shortness of breath.    Cardiovascular:  Negative for chest pain and palpitations.   Gastrointestinal:  Negative for diarrhea, nausea and vomiting.   Neurological:  Negative for headaches.       Patient Active Problem List   Diagnosis    Female bladder prolapse    Angina pectoris (CMS-HCC)    Chest pain    Arthritis    Osteoarthritis    Benign essential hypertension    Breast lump    Cervical lesion    Cystocele and rectocele with complete uterovaginal prolapse    Gastroesophageal reflux disease without esophagitis    Female stress incontinence    Fracture of humerus    Generalized osteoarthritis    Impingement syndrome of right shoulder    Knee pain    Myalgia    Nausea and vomiting    Pain in joint, hand    Postmenopausal bleeding    Raynaud's disease without gangrene    Right hip pain    Impingement of right shoulder    Shoulder injury    Right shoulder pain    Right wrist pain    Scleroderma (Multi)    Systemic sclerosis (Multi)    Dyspnea    Shortness of breath on exertion    Third degree hemorrhoids    Trigger finger, acquired    Urinary incontinence in female    Urinary retention with incomplete bladder emptying    Vaginal atrophy    Cyst of thyroid    Papillary microcarcinoma of thyroid (Multi)    Hypocalcemia    COPD (chronic obstructive pulmonary disease) with chronic bronchitis (Multi)    Degeneration of lumbar or lumbosacral intervertebral disc    H/O total thyroidectomy    Hyperlipidemia due to dietary fat intake    Insomnia    Liver cyst    Lupus  (Multi)    Osteoporosis without current pathological fracture    MP (metacarpophalangeal) joint sprain, initial encounter    Sprain of metacarpophalangeal (MCP) joint of left ring finger        Home Meds:  Current Outpatient Medications   Medication Instructions    acetaminophen (TYLENOL) 1,000 mg, oral, Every 6 hours PRN    albuterol 90 mcg/actuation inhaler 2 puffs, inhalation, Every 4 hours PRN    ascorbic acid (Vitamin C) 500 mg tablet 1 tablet, oral, Daily    aspirin 81 mg chewable tablet 1 tablet, oral, Daily    b complex vitamins capsule 1 capsule, oral, Daily    biotin 1,000 mcg, oral, Daily    calcium carbonate (Oscal) 500 mg calcium (1,250 mg) tablet 1 tablet, oral, Daily, For 30 days<BR>    diclofenac (VOLTAREN) 75 mg, oral, 2 times daily    dilTIAZem (CARDIZEM) 60 mg, oral, 2 times daily, For 30 days<BR>    folic acid (Folvite) 0.2 MG split tablet 2 half tablet, oral, Daily    folic acid/multivit,iron, (ONE DAILY FOR WOMEN ORAL) 1 tablet, oral, Daily    gabapentin (Neurontin) 300 mg capsule 1 capsule, oral, 3 times daily    hydroxychloroquine (PLAQUENIL) 200 mg, oral, Daily    levothyroxine (SYNTHROID, LEVOXYL) 112 mcg, oral, Daily, Skip tablet on Sundays    losartan (Cozaar) 100 mg tablet oral    methotrexate (TREXALL) 25 mg, oral, Once Weekly    omeprazole (PRILOSEC) 40 mg, oral, Daily    ondansetron ODT (ZOFRAN-ODT) 4 mg, oral, Every 6 hours PRN    rosuvastatin (CRESTOR) 10 mg, oral, Daily    traZODone (DESYREL) 50 mg, oral, Nightly    Trelegy Ellipta 100-62.5-25 mcg blister with device 1 puff, inhalation, Daily        Allergies   Allergen Reactions    Latex, Natural Rubber Other    Adhesive Tape-Silicones Other     skin degeneration with tape        Objective   Vitals:    04/15/24 1020   BP: 124/70   Pulse: 76   Resp: 16      Vitals:    04/15/24 1020   Weight: 59.2 kg (130 lb 9.6 oz)      Body mass index is 25.72 kg/m².   Physical Exam  Constitutional:       Appearance: Normal appearance. She is  "overweight.   HENT:      Head: Normocephalic and atraumatic.   Neck:      Thyroid: No thyroid mass or thyromegaly.      Comments: No palpable thyroid gland  Cardiovascular:      Rate and Rhythm: Normal rate and regular rhythm.      Heart sounds: No murmur heard.     No gallop.   Pulmonary:      Effort: Pulmonary effort is normal.      Breath sounds: Normal breath sounds.   Abdominal:      Palpations: Abdomen is soft.      Comments: benign   Neurological:      General: No focal deficit present.      Mental Status: She is alert and oriented to person, place, and time.      Deep Tendon Reflexes: Reflexes are normal and symmetric.   Psychiatric:         Behavior: Behavior is cooperative.         Labs:  Lab Results   Component Value Date    TSH 1.18 10/12/2023    FREET4 1.55 (H) 07/13/2023      No results found for: \"PR1\", \"THYROIDPAB\", \"TSI\"     Assessment/Plan   Problem List Items Addressed This Visit       Papillary microcarcinoma of thyroid (Multi)    Relevant Orders    Thyroid Stimulating Hormone    Thyroxine, Free    Thyroglobulin and Antithyroglobulin     Other Visit Diagnoses       Hypothyroidism, unspecified type    -  Primary          Discussed course  Due for labs and yearly ultrasound  If testing looks good will follow up in one year      Electronically signed by:  Dori Khan MD 04/15/24 10:46 AM              "

## 2024-04-17 ENCOUNTER — APPOINTMENT (OUTPATIENT)
Dept: ENDOCRINOLOGY | Facility: CLINIC | Age: 69
End: 2024-04-17
Payer: MEDICARE

## 2024-04-22 ENCOUNTER — APPOINTMENT (OUTPATIENT)
Dept: ORTHOPEDIC SURGERY | Facility: CLINIC | Age: 69
End: 2024-04-22
Payer: COMMERCIAL

## 2024-04-23 ENCOUNTER — OFFICE VISIT (OUTPATIENT)
Dept: ORTHOPEDIC SURGERY | Facility: CLINIC | Age: 69
End: 2024-04-23
Payer: COMMERCIAL

## 2024-04-23 DIAGNOSIS — M25.811 SHOULDER IMPINGEMENT, RIGHT: Primary | ICD-10-CM

## 2024-04-23 PROCEDURE — 99213 OFFICE O/P EST LOW 20 MIN: CPT | Performed by: ORTHOPAEDIC SURGERY

## 2024-04-23 ASSESSMENT — ENCOUNTER SYMPTOMS
CHILLS: 0
FATIGUE: 0
ARTHRALGIAS: 1
FEVER: 0
SHORTNESS OF BREATH: 0
WHEEZING: 0

## 2024-04-23 ASSESSMENT — PAIN - FUNCTIONAL ASSESSMENT: PAIN_FUNCTIONAL_ASSESSMENT: 0-10

## 2024-04-23 ASSESSMENT — PAIN SCALES - GENERAL: PAINLEVEL_OUTOF10: 2

## 2024-04-23 NOTE — PROGRESS NOTES
Reason for Appointment  Chief Complaint   Patient presents with    Right Shoulder - Follow-up     History of Present Illness  [facility] patient is a 69 y.o. female here today for follow-up evaluation of her right shoulder. Previous X-rays 3/2022 shows good alignment of the hardware, mild osteoarthritis of the joint. Since her last visit, pain has remained at baseline. She has aching pain in the shoulder that is worse with reaching and overhead motion. No other updates to PMH, allergies, or medications.     Past Medical History:   Diagnosis Date    Angina pectoris (CMS-HCC) 08/21/2023    Benign essential hypertension 08/21/2023    Cervicalgia     Neck pain    Chest pain 08/21/2023    Hyperlipidemia due to dietary fat intake 08/14/2023    Other conditions influencing health status     Osteoarthritis    Papillary microcarcinoma of thyroid (Multi) 10/12/2023    Personal history of other diseases of the digestive system     History of esophageal reflux    Personal history of other diseases of the musculoskeletal system and connective tissue     History of osteopenia    Personal history of other endocrine, nutritional and metabolic disease     History of hypothyroidism    Raynaud's disease without gangrene 08/14/2023    Rheumatoid arthritis, unspecified (Multi)     Rheumatoid arthritis    Shortness of breath on exertion 08/21/2023       Past Surgical History:   Procedure Laterality Date    BI US GUIDED BREAST LOCALIZATION AND BIOPSY RIGHT Right 7/18/2019    BI US GUIDED BREAST LOCALIZATION AND BIOPSY RIGHT [facility]    OTHER SURGICAL HISTORY  12/13/2022    Hysterectomy    OTHER SURGICAL HISTORY  12/13/2022    Bladder surgery       Medication Documentation Review Audit       Reviewed by Giovanna Aleman MA (Medical Assistant) on 04/23/24 at 0918      Medication Order Taking? Sig Documenting Provider Last Dose Status   acetaminophen (Tylenol) 500 mg tablet [id number] Yes Take 2 tablets (1,000 mg) by mouth every 6 hours  if needed. Gelacio Blakely MD Taking Active   albuterol 90 mcg/actuation inhaler [id number] Yes Inhale 2 puffs every 4 hours if needed for wheezing or shortness of breath. Gelacio Blakely MD Taking Active   ascorbic acid (Vitamin C) 500 mg tablet [id number] Yes Take 1 tablet (500 mg) by mouth once daily. Gelacio Blakely MD Taking Active   aspirin 81 mg chewable tablet [id number] Yes Chew 1 tablet (81 mg) once daily. Gelacio Blakely MD Taking Active   b complex vitamins capsule [id number] Yes Take 1 capsule by mouth once daily. Gelacio Blakely MD Taking Active   biotin 1 mg tablet [id number] Yes Take 1 tablet (1,000 mcg) by mouth once daily. Gelacio Blakely MD Taking Active   calcium carbonate (Oscal) 500 mg calcium (1,250 mg) tablet [id number] Yes Take 1 tablet (1,250 mg) by mouth once daily. For 30 days Gelacio Blakely MD Taking Active   diclofenac (Voltaren) 75 mg EC tablet [id number] Yes Take 1 tablet (75 mg) by mouth 2 times a day. Genesis Gudino MD Taking Active   dilTIAZem (Cardizem) 60 mg immediate release tablet [id number] Yes Take 1 tablet (60 mg) by mouth 2 times a day. Rudi Richardson MD Taking Active   folic acid (Folvite) 0.2 MG split tablet [id number] Yes Take 2 half tablet (0.4 mg) by mouth once daily. Gelacio Blakely MD Taking Active   folic acid/multivit,iron, (ONE DAILY FOR WOMEN ORAL) [id number] Yes Take 1 tablet by mouth once daily. Gelacio Blakely MD Taking Active   gabapentin (Neurontin) 300 mg capsule [id number] Yes Take 1 capsule (300 mg) by mouth 3 times a day. Gelacio Blakely MD Taking Active   hydroxychloroquine (PlaqueniL) 200 mg tablet [id number] Yes Take 1 tablet (200 mg) by mouth once daily. Genesis Gudino MD Taking Active   levothyroxine (Synthroid, Levoxyl) 112 mcg tablet [id number] Yes Take 1 tablet (112 mcg) by mouth once daily. Skip tablet on Sundays Dori Khan MD Taking Active   losartan (Cozaar) 100 mg tablet [id number] Yes  Take by mouth. Historical Provider, MD Taking Active   methotrexate (Trexall) 2.5 mg tablet [id number] Yes Take 10 tablets (25 mg total) by mouth 1 (one) time per week. Genesis Gudino MD Taking Active   omeprazole (PriLOSEC) 40 mg DR capsule [id number] Yes Take 1 capsule (40 mg) by mouth once daily. Genesis Gudino MD Taking Active   ondansetron ODT (Zofran-ODT) 4 mg disintegrating tablet [id number] Yes Take 1 tablet (4 mg) by mouth every 6 hours if needed for nausea. Genesis Gudino MD Taking Active   rosuvastatin (Crestor) 10 mg tablet [id number] Yes Take 1 tablet (10 mg) by mouth once daily. Historical Provider, MD Taking Active   traZODone (Desyrel) 50 mg tablet [id number] Yes Take 1 tablet (50 mg) by mouth once daily at bedtime. Historical Provider, MD Taking Active   Trelegy Ellipta 100-62.5-25 mcg blister with device [id number] Yes Inhale 1 puff once daily. Historical Provider, MD Taking Active                    Allergies   Allergen Reactions    Latex, Natural Rubber Other    Adhesive Tape-Silicones Other     skin degeneration with tape       Review of Systems   Constitutional:  Negative for chills, fatigue and fever.   Respiratory:  Negative for shortness of breath and wheezing.    Cardiovascular:  Negative for chest pain and leg swelling.   Musculoskeletal:  Positive for arthralgias.   All other systems reviewed and are negative.      Exam   On exam of the right arm, she has over 160 degrees ROM, good deltoid function, good rotation, good cuff strength, good pulses, good sensation    Assessment   Encounter Diagnosis   Name Primary?    Shoulder impingement, right Yes       Plan   Her shoulder is at baseline today and no further treatment is indicated at present. She will follow-up in about 6 months with X-rays of the shoulder.     Rosa DONG, attcamilo that this documentation has been prepared under the direction and in the presence of Praveen Myers MD. By signing below, Praveen DONG MD,  personally performed the services described in this documentation. All medical record entries made by the scribe were at my direction and in my presence. I have reviewed the chart and agree that the record reflects my personal performance and is accurate and complete. 04/23/24

## 2024-04-25 ENCOUNTER — APPOINTMENT (OUTPATIENT)
Dept: RADIOLOGY | Facility: CLINIC | Age: 69
End: 2024-04-25
Payer: MEDICARE

## 2024-04-30 ENCOUNTER — HOSPITAL ENCOUNTER (OUTPATIENT)
Dept: RADIOLOGY | Facility: CLINIC | Age: 69
Discharge: HOME | End: 2024-04-30
Payer: MEDICARE

## 2024-04-30 ENCOUNTER — LAB (OUTPATIENT)
Dept: LAB | Facility: LAB | Age: 69
End: 2024-04-30
Payer: MEDICARE

## 2024-04-30 DIAGNOSIS — K59.01 SLOW TRANSIT CONSTIPATION: ICD-10-CM

## 2024-04-30 DIAGNOSIS — C73 PAPILLARY MICROCARCINOMA OF THYROID (MULTI): ICD-10-CM

## 2024-04-30 DIAGNOSIS — R10.13 EPIGASTRIC PAIN: Primary | ICD-10-CM

## 2024-04-30 LAB
AMYLASE SERPL-CCNC: 51 U/L (ref 29–103)
LIPASE SERPL-CCNC: 43 U/L (ref 9–82)
T4 FREE SERPL-MCNC: 1.49 NG/DL (ref 0.78–1.48)
TSH SERPL-ACNC: 1.58 MIU/L (ref 0.44–3.98)

## 2024-04-30 PROCEDURE — 84443 ASSAY THYROID STIM HORMONE: CPT

## 2024-04-30 PROCEDURE — 74019 RADEX ABDOMEN 2 VIEWS: CPT

## 2024-04-30 PROCEDURE — 86800 THYROGLOBULIN ANTIBODY: CPT

## 2024-04-30 PROCEDURE — 82150 ASSAY OF AMYLASE: CPT

## 2024-04-30 PROCEDURE — 84432 ASSAY OF THYROGLOBULIN: CPT

## 2024-04-30 PROCEDURE — 84439 ASSAY OF FREE THYROXINE: CPT

## 2024-04-30 PROCEDURE — 83690 ASSAY OF LIPASE: CPT

## 2024-04-30 PROCEDURE — 74019 RADEX ABDOMEN 2 VIEWS: CPT | Performed by: RADIOLOGY

## 2024-04-30 PROCEDURE — 36415 COLL VENOUS BLD VENIPUNCTURE: CPT

## 2024-05-01 ENCOUNTER — HOSPITAL ENCOUNTER (OUTPATIENT)
Dept: RADIOLOGY | Facility: HOSPITAL | Age: 69
Discharge: HOME | End: 2024-05-01
Payer: MEDICARE

## 2024-05-01 DIAGNOSIS — C73 PAPILLARY MICROCARCINOMA OF THYROID (MULTI): ICD-10-CM

## 2024-05-01 PROCEDURE — 76536 US EXAM OF HEAD AND NECK: CPT | Performed by: RADIOLOGY

## 2024-05-01 PROCEDURE — 76536 US EXAM OF HEAD AND NECK: CPT

## 2024-05-02 LAB
BILL ONLY-THYROGLOBULIN: NORMAL
THYROGLOB AB SERPL-ACNC: <0.9 IU/ML (ref 0–4)
THYROGLOB SERPL-MCNC: <0.1 NG/ML (ref 1.3–31.8)
THYROGLOB SERPL-MCNC: ABNORMAL NG/ML (ref 1.3–31.8)

## 2024-05-06 NOTE — OP NOTE
PREOPERATIVE DIAGNOSIS:  Multinodular thyroid.    POSTOPERATIVE DIAGNOSIS:  Multinodular thyroid.    OPERATION/PROCEDURE:  Total thyroidectomy.    SURGEON:  Praveen Sierra MD.    ASSISTANT(S):  Dr. Sarika Ly.    ANESTHESIA:  General.    ESTIMATED BLOOD LOSS:  5 cc.    HISTORY OF PRESENT ILLNESS AND SURGICAL INDICATIONS:  The patient is a 68-year-old woman who was found to have a  multinodular thyroid gland on ultrasound.  She had bilateral nodules  that met criteria for biopsy.  We discussed options.  She was  actually in favor of total thyroidectomy to avoid having to go  through preop biopsies and long-term surveillance issues.  Risks and  benefits of surgery including lifelong need for thyroid hormone  replacement were discussed.  She agreed and signed consent.     DESCRIPTION OF OPERATION:  On the operative date, she was brought to the operating room.  After  induction of anesthetic, she was prepped and draped in usual sterile  fashion with towel behind the shoulders and neck gently extended.  She had SCDs on for DVT prophylaxis.  We made a 6 cm cervical collar  incision, divided down through platysma with Bovie electrocautery.  We raised subplatysmal flaps superior to the notch in the thyroid  cartilage, inferior to the sternal notch, lateral to the  sternocleidomastoid muscles.  We  the strap muscles, started  on the right side, retracting right sternohyoid and right  sternothyroid muscles out lateral to the level of the carotid artery.   Middle thyroid vein was identified, ligated, and divided with 3-0  silk ties.  There was a palpable 1.5 cm nodule in the inferior  portion of the right thyroid lobe that was well contained.  We went  up and identified the cricothyroid space, went medial to lateral  around the superior pole vessels.  At the 11 o'clock position was a  normal-appearing right superior parathyroid gland, peeled away intact  on a vascularized pedicle.  We then went down over  the trachea, took  branches of the inferior thyroid vein.  At the 8 o'clock position was  a normal-appearing right inferior parathyroid gland, peeled away  intact on a vascularized pedicle.  We then dissected out laterally,  identified the inferior thyroid artery.  Coursing underneath it,  running parallel to the trachea was the right recurrent laryngeal  nerve.  We stayed anteromedial to the nerve with all 3-0 silk ties  and followed the nerve up all the way into the cricothyroid  musculature.  We then rolled the thyroid gland up on to the trachea,  divided behind the isthmus.  There was a small area of pyramidal lobe  going up in the midline.  We divided on either side of it and then  transected across the top once it thinned out.  We saw no abnormal  central neck lymphadenopathy.  We then went to the left side,  retracted left sternohyoid and left sternothyroid muscles out lateral  to the level of the carotid artery.  On this side, there was about a  2 cm nodule in the superior portion of the left thyroid lobe.  We  divided the middle thyroid vein with 3-0 silk ties.  Then, we  identified the cricothyroid space, went medial to lateral on the  superior pole vessels.  On this side, the left superior parathyroid  gland was at about the 1 o'clock position.  This was peeled away  intact on a well-vascularized pedicle with 3-0 silk ties.  We then  went down to the trachea, took branches of the inferior thyroid vein.   At the 5 o'clock position was a normal-appearing left inferior  parathyroid gland, peeled away intact on a vascularized pedicle.  The  Anesthesia team placed an esophageal temperature probe which was  palpable.  We dissected down into the tracheoesophageal groove,  identified the left recurrent laryngeal nerve.  We stayed  anteromedial to the nerve with all 3-0 silk ties.  LigaSure was not  used in this area.  I followed the nerve all the way up into the  cricothyroid musculature, mobilized the last of  the thyroid up on to  the trachea, and then removed the specimen.  It was marked with a  double-tailed suture on the right superior pole, single-tailed suture  on the left superior pole, and sent off to Pathology.  We then  irrigated the neck out well and achieved hemostasis.  Both recurrent  nerves were intact along their course.  All 4 parathyroids looked  well vascularized.  Overall, hemostasis was good.  We used some  Izzy powder for final hemostasis, closed straps and platysma with  3-0 Vicryl, skin with a 4-0 Monocryl.  0.5% Marcaine was used as  local anesthetic, and dry sterile dressings were applied.  The  patient tolerated the procedure well, was transferred to Recovery in  stable condition.       Praveen Sierra MD    DD:  03/27/2023 15:46:05 EST  DT:  03/28/2023 04:39:39 EST  DICTATION NUMBER:  [id number]  INTERNAL JOB NUMBER:  [id number]    CC:  MD [facility]        Electronic Signatures:  Praveen Sierra) (Signed on 28-Mar-2023 07:00)   Authored  Unsigned, Draft (SYS GENERATED) (Entered on 28-Mar-2023 04:39)   Entered    Last Updated: 28-Mar-2023 07:00 by Praveen Sierra)

## 2024-05-28 ENCOUNTER — OFFICE VISIT (OUTPATIENT)
Dept: CARDIOLOGY | Facility: CLINIC | Age: 69
End: 2024-05-28
Payer: MEDICARE

## 2024-05-28 VITALS
WEIGHT: 124 LBS | OXYGEN SATURATION: 98 % | RESPIRATION RATE: 18 BRPM | HEART RATE: 65 BPM | BODY MASS INDEX: 24.35 KG/M2 | TEMPERATURE: 98.6 F | HEIGHT: 60 IN | DIASTOLIC BLOOD PRESSURE: 59 MMHG | SYSTOLIC BLOOD PRESSURE: 97 MMHG

## 2024-05-28 DIAGNOSIS — R06.02 SOB (SHORTNESS OF BREATH): ICD-10-CM

## 2024-05-28 DIAGNOSIS — R06.02 SHORTNESS OF BREATH ON EXERTION: Primary | ICD-10-CM

## 2024-05-28 DIAGNOSIS — R07.2 PRECORDIAL PAIN: ICD-10-CM

## 2024-05-28 DIAGNOSIS — E78.49 HYPERLIPIDEMIA DUE TO DIETARY FAT INTAKE: ICD-10-CM

## 2024-05-28 DIAGNOSIS — I10 BENIGN ESSENTIAL HYPERTENSION: ICD-10-CM

## 2024-05-28 DIAGNOSIS — I73.00 RAYNAUD'S DISEASE WITHOUT GANGRENE: ICD-10-CM

## 2024-05-28 DIAGNOSIS — I20.9 ANGINA PECTORIS (CMS-HCC): ICD-10-CM

## 2024-05-28 PROCEDURE — 1159F MED LIST DOCD IN RCRD: CPT | Performed by: INTERNAL MEDICINE

## 2024-05-28 PROCEDURE — 1036F TOBACCO NON-USER: CPT | Performed by: INTERNAL MEDICINE

## 2024-05-28 PROCEDURE — 3074F SYST BP LT 130 MM HG: CPT | Performed by: INTERNAL MEDICINE

## 2024-05-28 PROCEDURE — 99214 OFFICE O/P EST MOD 30 MIN: CPT | Performed by: INTERNAL MEDICINE

## 2024-05-28 PROCEDURE — 1126F AMNT PAIN NOTED NONE PRSNT: CPT | Performed by: INTERNAL MEDICINE

## 2024-05-28 PROCEDURE — 3078F DIAST BP <80 MM HG: CPT | Performed by: INTERNAL MEDICINE

## 2024-05-28 PROCEDURE — 1160F RVW MEDS BY RX/DR IN RCRD: CPT | Performed by: INTERNAL MEDICINE

## 2024-05-28 ASSESSMENT — PATIENT HEALTH QUESTIONNAIRE - PHQ9
SUM OF ALL RESPONSES TO PHQ9 QUESTIONS 1 AND 2: 0
2. FEELING DOWN, DEPRESSED OR HOPELESS: NOT AT ALL
1. LITTLE INTEREST OR PLEASURE IN DOING THINGS: NOT AT ALL

## 2024-05-28 ASSESSMENT — LIFESTYLE VARIABLES
AUDIT-C TOTAL SCORE: 1
HOW OFTEN DO YOU HAVE SIX OR MORE DRINKS ON ONE OCCASION: NEVER
HAS A RELATIVE, FRIEND, DOCTOR, OR ANOTHER HEALTH PROFESSIONAL EXPRESSED CONCERN ABOUT YOUR DRINKING OR SUGGESTED YOU CUT DOWN: NO
HOW MANY STANDARD DRINKS CONTAINING ALCOHOL DO YOU HAVE ON A TYPICAL DAY: 1 OR 2
HAVE YOU OR SOMEONE ELSE BEEN INJURED AS A RESULT OF YOUR DRINKING: NO
SKIP TO QUESTIONS 9-10: 1
HOW OFTEN DO YOU HAVE A DRINK CONTAINING ALCOHOL: MONTHLY OR LESS
AUDIT TOTAL SCORE: 1

## 2024-05-28 ASSESSMENT — ENCOUNTER SYMPTOMS
LOSS OF SENSATION IN FEET: 0
DEPRESSION: 0
OCCASIONAL FEELINGS OF UNSTEADINESS: 1

## 2024-05-28 ASSESSMENT — PAIN SCALES - GENERAL: PAINLEVEL: 0-NO PAIN

## 2024-05-28 NOTE — PROGRESS NOTES
History of present illness:  This is a very pleasant 68-year-old female self-referred to my office for evaluation of episodes of chest pains. Patient had history of hypertension and scleroderma. Patient history of remote smoking. Had in April hospitalization for chest pain with negative troponin. Cardiac catheterization in 2022 showed no major coronary disease.  Echocardiogram and November 2023 showed normal ejection fraction mild mitral and aortic valve regurgitationPatient returns to my office for follow-up. Doing overall good. She still complaining some shortness of breath with moderate activity. Very infrequent episodes of chest tightness.  Patient denies palpitations dizziness or lightheadedness.  No syncope.      Past Medical History:   Diagnosis Date    Angina pectoris (CMS-HCC) 08/21/2023    Benign essential hypertension 08/21/2023    Cervicalgia     Neck pain    Chest pain 08/21/2023    Hyperlipidemia due to dietary fat intake 08/14/2023    Other conditions influencing health status     Osteoarthritis    Papillary microcarcinoma of thyroid (Multi) 10/12/2023    Personal history of other diseases of the digestive system     History of esophageal reflux    Personal history of other diseases of the musculoskeletal system and connective tissue     History of osteopenia    Personal history of other endocrine, nutritional and metabolic disease     History of hypothyroidism    Raynaud's disease without gangrene 08/14/2023    Rheumatoid arthritis, unspecified (Multi)     Rheumatoid arthritis    Shortness of breath on exertion 08/21/2023       Past Surgical History:   Procedure Laterality Date    BI US GUIDED BREAST LOCALIZATION AND BIOPSY RIGHT Right 7/18/2019    BI US GUIDED BREAST LOCALIZATION AND BIOPSY RIGHT [facility]    OTHER SURGICAL HISTORY  12/13/2022    Hysterectomy    OTHER SURGICAL HISTORY  12/13/2022    Bladder surgery       Allergies   Allergen Reactions    Latex, Natural Rubber Other     Adhesive Tape-Silicones Other     skin degeneration with tape        reports that she quit smoking about 15 years ago. Her smoking use included cigarettes. She has never been exposed to tobacco smoke. She has never used smokeless tobacco. She reports that she does not currently use alcohol. Drug use questions deferred to the physician.    Family History   Problem Relation Name Age of Onset    Diabetes Mother      Heart disease Mother      Arthritis Mother      COPD Mother      Hypertension Mother      Thyroid cancer Father      Thyroid cancer Sister      Diabetes Brother      Hepatitis Daughter      Other (partial thyriodectomy) Daughter      Other (hysterectomy) Daughter      Other (gastric bypass) Son         Patient's Medications   New Prescriptions    No medications on file   Previous Medications    ACETAMINOPHEN (TYLENOL) 500 MG TABLET    Take 2 tablets (1,000 mg) by mouth every 6 hours if needed.    ALBUTEROL 90 MCG/ACTUATION INHALER    Inhale 2 puffs every 4 hours if needed for wheezing or shortness of breath.    ASCORBIC ACID (VITAMIN C) 500 MG TABLET    Take 1 tablet (500 mg) by mouth once daily.    ASPIRIN 81 MG CHEWABLE TABLET    Chew 1 tablet (81 mg) once daily.    B COMPLEX VITAMINS CAPSULE    Take 1 capsule by mouth once daily.    BIOTIN 1 MG TABLET    Take 1 tablet (1,000 mcg) by mouth once daily.    CALCIUM CARBONATE (OSCAL) 500 MG CALCIUM (1,250 MG) TABLET    Take 1 tablet (1,250 mg) by mouth once daily. For 30 days    DICLOFENAC (VOLTAREN) 75 MG EC TABLET    Take 1 tablet (75 mg) by mouth 2 times a day.    DILTIAZEM (CARDIZEM) 60 MG IMMEDIATE RELEASE TABLET    Take 1 tablet (60 mg) by mouth 2 times a day.    FOLIC ACID (FOLVITE) 0.2 MG SPLIT TABLET    Take 2 half tablet (0.4 mg) by mouth once daily.    FOLIC ACID/MULTIVIT,IRON, (ONE DAILY FOR WOMEN ORAL)    Take 1 tablet by mouth once daily.    GABAPENTIN (NEURONTIN) 300 MG CAPSULE    Take 1 capsule (300 mg) by mouth 3 times a day.     HYDROXYCHLOROQUINE (PLAQUENIL) 200 MG TABLET    Take 1 tablet (200 mg) by mouth once daily.    LEVOTHYROXINE (SYNTHROID, LEVOXYL) 112 MCG TABLET    Take 1 tablet (112 mcg) by mouth once daily. Skip tablet on Sundays    LOSARTAN (COZAAR) 100 MG TABLET    Take by mouth.    METHOTREXATE (TREXALL) 2.5 MG TABLET    Take 10 tablets (25 mg total) by mouth 1 (one) time per week.    OMEPRAZOLE (PRILOSEC) 40 MG DR CAPSULE    Take 1 capsule (40 mg) by mouth once daily.    ONDANSETRON ODT (ZOFRAN-ODT) 4 MG DISINTEGRATING TABLET    Take 1 tablet (4 mg) by mouth every 6 hours if needed for nausea.    ROSUVASTATIN (CRESTOR) 10 MG TABLET    Take 1 tablet (10 mg) by mouth once daily.    TRAZODONE (DESYREL) 50 MG TABLET    Take 0.5 tablets (25 mg) by mouth once daily at bedtime.    TRELEGY ELLIPTA 100-62.5-25 MCG BLISTER WITH DEVICE    Inhale 1 puff once daily.   Modified Medications    No medications on file   Discontinued Medications    No medications on file       Objective   Physical Exam  General: Patient in no acute distress   HEENT: Atraumatic normocephalic.  Neck: Supple, jugular venous pressure within normal limit.  No bruits  Lungs: Clear to auscultation bilaterally  Cardiovascular: Regular rate and rhythm, normal heart sounds, no murmurs rubs or gallops  Abdomen: Soft nontender nondistended.  Normal bowel sounds.  Extremities: Warm to touch, no edema.      Lab Review   Lab on 04/30/2024   Component Date Value    Thyroid Stimulating Horm* 04/30/2024 1.58     Thyroxine, Free 04/30/2024 1.49 (H)     Thyroglobulin 04/30/2024 <0.1 (L)     Thyroglobulin LC-MS/MS 04/30/2024 Not Applicable     Anti-Thyroglobulin AB 04/30/2024 <0.9     Lipase 04/30/2024 43     Amylase 04/30/2024 51     Bill Only Thyroglobulin 04/30/2024 Billed    Lab on 04/08/2024   Component Date Value    WBC 04/08/2024 5.6     nRBC 04/08/2024 0.0     RBC 04/08/2024 4.26     Hemoglobin 04/08/2024 12.9     Hematocrit 04/08/2024 41.5     MCV 04/08/2024 97     MCH  04/08/2024 30.3     MCHC 04/08/2024 31.1 (L)     RDW 04/08/2024 13.5     Platelets 04/08/2024 290     Glucose 04/08/2024 79     Sodium 04/08/2024 142     Potassium 04/08/2024 4.6     Chloride 04/08/2024 103     Bicarbonate 04/08/2024 30     Anion Gap 04/08/2024 14     Urea Nitrogen 04/08/2024 16     Creatinine 04/08/2024 0.62     eGFR 04/08/2024 >90     Calcium 04/08/2024 9.0     Albumin 04/08/2024 4.1     Alkaline Phosphatase 04/08/2024 55     Total Protein 04/08/2024 6.4     AST 04/08/2024 19     Bilirubin, Total 04/08/2024 0.4     ALT 04/08/2024 22     C-Reactive Protein 04/08/2024 <0.10     Sedimentation Rate 04/08/2024 9         Assessment/Plan   Patient Active Problem List   Diagnosis    Female bladder prolapse    Angina pectoris (CMS-[facility])    Chest pain    Arthritis    Osteoarthritis    Benign essential hypertension    Breast lump    Cervical lesion    Cystocele and rectocele with complete uterovaginal prolapse    Gastroesophageal reflux disease without esophagitis    Female stress incontinence    Fracture of humerus    Generalized osteoarthritis    Impingement syndrome of right shoulder    Knee pain    Myalgia    Nausea and vomiting    Pain in joint, hand    Postmenopausal bleeding    Raynaud's disease without gangrene    Right hip pain    Impingement of right shoulder    Shoulder injury    Right shoulder pain    Right wrist pain    Scleroderma (Multi)    Systemic sclerosis (Multi)    Dyspnea    Shortness of breath on exertion    Third degree hemorrhoids    Trigger finger, acquired    Urinary incontinence in female    Urinary retention with incomplete bladder emptying    Vaginal atrophy    Cyst of thyroid    Papillary microcarcinoma of thyroid (Multi)    Hypocalcemia    COPD (chronic obstructive pulmonary disease) with chronic bronchitis (Multi)    Degeneration of lumbar or lumbosacral intervertebral disc    H/O total thyroidectomy    Hyperlipidemia due to dietary fat intake    Insomnia    Liver cyst    Lupus  (Multi)    Osteoporosis without current pathological fracture    MP (metacarpophalangeal) joint sprain, initial encounter    Sprain of metacarpophalangeal (MCP) joint of left ring finger        This is a very pleasant 68-year-old female self-referred to my office for evaluation of episodes of chest pains. Patient had history of hypertension and scleroderma. Patient history of remote smoking. Had in April hospitalization for chest pain with negative troponin. Cardiac catheterization in 2022 showed no major coronary disease.  Echocardiogram and November 2023 showed normal ejection fraction mild mitral and aortic valve regurgitationPatient returns to my office for follow-up. Doing overall good. She still complaining some shortness of breath with moderate activity. Very infrequent episodes of chest tightness.  Patient denies palpitations dizziness or lightheadedness.  No syncope.  Patient at this point stable from my standpoint.  Continue current medications.  Discussed with her to cut back on losartan if her blood pressure remains low.  We will follow-up in a year.      Rudi Richardson MD

## 2024-08-13 ENCOUNTER — APPOINTMENT (OUTPATIENT)
Dept: RHEUMATOLOGY | Facility: CLINIC | Age: 69
End: 2024-08-13
Payer: COMMERCIAL

## 2024-09-23 ENCOUNTER — APPOINTMENT (OUTPATIENT)
Dept: ORTHOPEDIC SURGERY | Facility: CLINIC | Age: 69
End: 2024-09-23
Payer: COMMERCIAL

## 2024-09-23 ENCOUNTER — APPOINTMENT (OUTPATIENT)
Dept: ORTHOPEDIC SURGERY | Facility: CLINIC | Age: 69
End: 2024-09-23
Payer: MEDICARE

## 2024-09-24 ENCOUNTER — APPOINTMENT (OUTPATIENT)
Dept: ORTHOPEDIC SURGERY | Facility: CLINIC | Age: 69
End: 2024-09-24
Payer: COMMERCIAL

## 2024-09-30 ENCOUNTER — APPOINTMENT (OUTPATIENT)
Dept: ORTHOPEDIC SURGERY | Facility: CLINIC | Age: 69
End: 2024-09-30
Payer: COMMERCIAL

## 2024-09-30 ENCOUNTER — HOSPITAL ENCOUNTER (OUTPATIENT)
Dept: RADIOLOGY | Facility: CLINIC | Age: 69
Discharge: HOME | End: 2024-09-30
Payer: MEDICARE

## 2024-09-30 DIAGNOSIS — M25.811 SHOULDER IMPINGEMENT, RIGHT: Primary | ICD-10-CM

## 2024-09-30 DIAGNOSIS — M25.811 SHOULDER IMPINGEMENT, RIGHT: ICD-10-CM

## 2024-09-30 PROCEDURE — 73030 X-RAY EXAM OF SHOULDER: CPT | Mod: RT

## 2024-09-30 PROCEDURE — 73030 X-RAY EXAM OF SHOULDER: CPT | Mod: RIGHT SIDE | Performed by: RADIOLOGY

## 2024-09-30 PROCEDURE — 99213 OFFICE O/P EST LOW 20 MIN: CPT | Performed by: ORTHOPAEDIC SURGERY

## 2024-09-30 ASSESSMENT — ENCOUNTER SYMPTOMS
TROUBLE SWALLOWING: 0
EYE DISCHARGE: 0
ARTHRALGIAS: 1
SINUS PRESSURE: 0
WHEEZING: 0
JOINT SWELLING: 0
CHILLS: 0
SHORTNESS OF BREATH: 0
FEVER: 0

## 2024-09-30 ASSESSMENT — PAIN SCALES - GENERAL: PAINLEVEL_OUTOF10: 2

## 2024-09-30 ASSESSMENT — PAIN - FUNCTIONAL ASSESSMENT: PAIN_FUNCTIONAL_ASSESSMENT: 0-10

## 2024-09-30 NOTE — PROGRESS NOTES
"Attended vaginal delivery 8 / 9 APGARs.  No distress noted at birth. VSS.     Attempted to assist with breastfeeding, but infant noted to be frequently tongue thrusting, which is preventing appropriate latch from being achieved. Mom requests to complete another attempt in a while. Mother open to breast or bottle feed, stating, "As long as my baby gets fed... I'm willing to try."    Mom did skin to skin with period of other family holding infant when RN not in room. L&D nurse discovered cessation of S2S, checked infant temp (97.6 axillary) and encouraged further skin to skin with mom; Mom agreed. Infant returned S2S with mom by RN and placed 2 warmed blankets over pt.     1500- Temp noted to be 97.3, so infant brought to radiant warmer.  Footprints and measurements obtained in LDR while under warmer.     1535: Temp 98. Infant dressed and wrapped in blanket at mother's request.     NB medication administered per orders. Handoff report given to MAG Baptiste RN.    " Reason for Appointment  Chief Complaint   Patient presents with    Right Shoulder - Follow-up     History of Present Illness  Patient is a 69 y.o. female here today for a [facility] case follow-up evaluation of mild right shoulder aching and pain.  She is status post an ORIF of a right proximal humerus fracture.  X-rays taken again today of the shoulder are reviewed and show a healed fracture with no loosening of hardware with mild joint arthritis but no significant change since previous x-rays.  She continues to have some mild aching and pain especially when the weather changes and it rains.  She is able to use the arm for gardening and light activities.  She still has stiffness reaching behind her back.  No other changes in her past medical history, allergies, or medications.    Past Medical History:   Diagnosis Date    Angina pectoris (CMS-HCC) 08/21/2023    Benign essential hypertension 08/21/2023    Cervicalgia     Neck pain    Chest pain 08/21/2023    Hyperlipidemia due to dietary fat intake 08/14/2023    Other conditions influencing health status     Osteoarthritis    Papillary microcarcinoma of thyroid (Multi) 10/12/2023    Personal history of other diseases of the digestive system     History of esophageal reflux    Personal history of other diseases of the musculoskeletal system and connective tissue     History of osteopenia    Personal history of other endocrine, nutritional and metabolic disease     History of hypothyroidism    Raynaud's disease without gangrene 08/14/2023    Rheumatoid arthritis, unspecified (Multi)     Rheumatoid arthritis    Shortness of breath on exertion 08/21/2023       Past Surgical History:   Procedure Laterality Date    BI US GUIDED BREAST LOCALIZATION AND BIOPSY RIGHT Right 7/18/2019    BI US GUIDED BREAST LOCALIZATION AND BIOPSY RIGHT [facility]    OTHER SURGICAL HISTORY  12/13/2022    Hysterectomy    OTHER SURGICAL HISTORY  12/13/2022    Bladder surgery       Medication  Documentation Review Audit       Reviewed by Joi Astorga PA-C (Physician Assistant) on 09/30/24 at 0948      Medication Order Taking? Sig Documenting Provider Last Dose Status   acetaminophen (Tylenol) 500 mg tablet [id number] Yes Take 2 tablets (1,000 mg) by mouth every 6 hours if needed. Gelacio Blakely MD Taking Active   albuterol 90 mcg/actuation inhaler [id number] Yes Inhale 2 puffs every 4 hours if needed for wheezing or shortness of breath. Gelacio Blakely MD Taking Active   ascorbic acid (Vitamin C) 500 mg tablet [id number] Yes Take 1 tablet (500 mg) by mouth once daily. Historical MD Reddy Taking Active   aspirin 81 mg chewable tablet [id number] Yes Chew 1 tablet (81 mg) once daily. Historical MD Reddy Taking Active   b complex vitamins capsule [id number] Yes Take 1 capsule by mouth once daily. Historical MD Reddy Taking Active   biotin 1 mg tablet [id number] Yes Take 1 tablet (1,000 mcg) by mouth once daily. Historical MD Reddy Taking Active   calcium carbonate (Oscal) 500 mg calcium (1,250 mg) tablet [id number] Yes Take 1 tablet (1,250 mg) by mouth once daily. For 30 days Gelacio Blakely MD Taking Active   diclofenac (Voltaren) 75 mg EC tablet [id number] Yes Take 1 tablet (75 mg) by mouth 2 times a day. Genesis Gudino MD Taking Active   dilTIAZem (Cardizem) 60 mg immediate release tablet [id number] Yes Take 1 tablet (60 mg) by mouth 2 times a day. Rudi Richardson MD Taking Active   folic acid (Folvite) 0.2 MG split tablet [id number] Yes Take 2 half tablet (0.4 mg) by mouth once daily. Historical MD Reddy Taking Active   folic acid/multivit,iron, (ONE DAILY FOR WOMEN ORAL) [id number] Yes Take 1 tablet by mouth once daily. Gelacio Blakely MD Taking Active   gabapentin (Neurontin) 300 mg capsule [id number] Yes Take 1 capsule (300 mg) by mouth 3 times a day. Gelacio Blakely MD Taking Active   hydroxychloroquine (PlaqueniL) 200 mg tablet [id number] Yes Take 1  tablet (200 mg) by mouth once daily. Genesis Gudino MD Taking Active   levothyroxine (Synthroid, Levoxyl) 112 mcg tablet [id number] Yes Take 1 tablet (112 mcg) by mouth once daily. Skip tablet on Sundays Dori Khan MD Taking Active   losartan (Cozaar) 100 mg tablet [id number] Yes Take by mouth. Historical Provider, MD Taking Active   methotrexate (Trexall) 2.5 mg tablet [id number] Yes Take 10 tablets (25 mg total) by mouth 1 (one) time per week. Genesis Gudino MD Taking Active   omeprazole (PriLOSEC) 40 mg DR capsule [id number] Yes Take 1 capsule (40 mg) by mouth once daily. Genesis Gudino MD Taking Active   ondansetron ODT (Zofran-ODT) 4 mg disintegrating tablet [id number] Yes Take 1 tablet (4 mg) by mouth every 6 hours if needed for nausea. Genesis Gudino MD Taking Active   rosuvastatin (Crestor) 10 mg tablet [id number] Yes Take 1 tablet (10 mg) by mouth once daily. Historical Provider, MD Taking Active   traZODone (Desyrel) 50 mg tablet [id number] Yes Take 0.5 tablets (25 mg) by mouth once daily at bedtime. Historical Provider, MD Taking Active   Trelegy Ellipta 100-62.5-25 mcg blister with device [id number] Yes Inhale 1 puff once daily. Historical Provider, MD Taking Active                    Allergies   Allergen Reactions    Latex, Natural Rubber Other    Adhesive Tape-Silicones Other     skin degeneration with tape       Review of Systems   Constitutional:  Negative for chills and fever.   HENT:  Negative for nosebleeds, sinus pressure and trouble swallowing.    Eyes:  Negative for discharge.   Respiratory:  Negative for shortness of breath and wheezing.    Cardiovascular:  Negative for chest pain.   Musculoskeletal:  Positive for arthralgias. Negative for joint swelling.   All other systems reviewed and are negative.    Exam   On exam right shoulder shows a well-healed scar with no severe swelling.  She has 140 degrees of active forward flexion today with no crepitation, stiffness with internal  rotation behind the back.  Good cuff strength with resisted external rotation.  Deltoid is functional.  Good pulses and sensation in the upper extremity.    Assessment   Encounter Diagnosis   Name Primary?    Shoulder impingement, right      Plan   She is doing very well, having no significant issues today, just some mild aching and baseline stiffness in the shoulder.  She is careful with heavy lifting and significant activity.  She can follow-up with us on a 6-month basis she has any new issues.

## 2024-10-02 ENCOUNTER — APPOINTMENT (OUTPATIENT)
Dept: RADIOLOGY | Facility: HOSPITAL | Age: 69
End: 2024-10-02
Payer: COMMERCIAL

## 2024-10-02 DIAGNOSIS — Z12.31 SCREENING MAMMOGRAM FOR BREAST CANCER: ICD-10-CM

## 2024-10-02 DIAGNOSIS — R11.2 NAUSEA AND VOMITING, UNSPECIFIED VOMITING TYPE: ICD-10-CM

## 2024-10-02 RX ORDER — ONDANSETRON 4 MG/1
TABLET, ORALLY DISINTEGRATING ORAL
Qty: 20 TABLET | Refills: 0 | Status: SHIPPED | OUTPATIENT
Start: 2024-10-02

## 2024-10-03 ENCOUNTER — APPOINTMENT (OUTPATIENT)
Dept: RADIOLOGY | Facility: HOSPITAL | Age: 69
End: 2024-10-03
Payer: MEDICARE

## 2024-10-03 ENCOUNTER — APPOINTMENT (OUTPATIENT)
Dept: RHEUMATOLOGY | Facility: CLINIC | Age: 69
End: 2024-10-03
Payer: MEDICARE

## 2024-10-28 ENCOUNTER — HOSPITAL ENCOUNTER (OUTPATIENT)
Dept: RADIOLOGY | Facility: HOSPITAL | Age: 69
Discharge: HOME | End: 2024-10-28
Payer: MEDICARE

## 2024-10-28 ENCOUNTER — APPOINTMENT (OUTPATIENT)
Dept: RHEUMATOLOGY | Facility: CLINIC | Age: 69
End: 2024-10-28
Payer: MEDICARE

## 2024-10-28 VITALS
BODY MASS INDEX: 26.58 KG/M2 | WEIGHT: 135.4 LBS | HEIGHT: 60 IN | DIASTOLIC BLOOD PRESSURE: 71 MMHG | SYSTOLIC BLOOD PRESSURE: 105 MMHG | HEART RATE: 82 BPM | OXYGEN SATURATION: 99 %

## 2024-10-28 VITALS — WEIGHT: 130 LBS | HEIGHT: 59 IN | BODY MASS INDEX: 26.21 KG/M2

## 2024-10-28 DIAGNOSIS — R11.2 NAUSEA AND VOMITING, UNSPECIFIED VOMITING TYPE: ICD-10-CM

## 2024-10-28 DIAGNOSIS — E03.9 HYPOTHYROIDISM, UNSPECIFIED TYPE: ICD-10-CM

## 2024-10-28 DIAGNOSIS — K21.9 GASTROESOPHAGEAL REFLUX DISEASE WITHOUT ESOPHAGITIS: ICD-10-CM

## 2024-10-28 DIAGNOSIS — Z79.899 ENCOUNTER FOR LONG-TERM (CURRENT) USE OF HIGH-RISK MEDICATION: ICD-10-CM

## 2024-10-28 DIAGNOSIS — Z12.31 ENCOUNTER FOR SCREENING MAMMOGRAM FOR MALIGNANT NEOPLASM OF BREAST: ICD-10-CM

## 2024-10-28 DIAGNOSIS — M34.9 SYSTEMIC SCLEROSIS (MULTI): Primary | ICD-10-CM

## 2024-10-28 DIAGNOSIS — M81.8 OTHER OSTEOPOROSIS WITHOUT CURRENT PATHOLOGICAL FRACTURE: ICD-10-CM

## 2024-10-28 PROCEDURE — 77067 SCR MAMMO BI INCL CAD: CPT | Performed by: RADIOLOGY

## 2024-10-28 PROCEDURE — 3078F DIAST BP <80 MM HG: CPT | Performed by: INTERNAL MEDICINE

## 2024-10-28 PROCEDURE — 1036F TOBACCO NON-USER: CPT | Performed by: INTERNAL MEDICINE

## 2024-10-28 PROCEDURE — 77063 BREAST TOMOSYNTHESIS BI: CPT | Performed by: RADIOLOGY

## 2024-10-28 PROCEDURE — 1159F MED LIST DOCD IN RCRD: CPT | Performed by: INTERNAL MEDICINE

## 2024-10-28 PROCEDURE — 3008F BODY MASS INDEX DOCD: CPT | Performed by: INTERNAL MEDICINE

## 2024-10-28 PROCEDURE — 77063 BREAST TOMOSYNTHESIS BI: CPT

## 2024-10-28 PROCEDURE — G2211 COMPLEX E/M VISIT ADD ON: HCPCS | Performed by: INTERNAL MEDICINE

## 2024-10-28 PROCEDURE — 99213 OFFICE O/P EST LOW 20 MIN: CPT | Performed by: INTERNAL MEDICINE

## 2024-10-28 PROCEDURE — 3074F SYST BP LT 130 MM HG: CPT | Performed by: INTERNAL MEDICINE

## 2024-10-28 RX ORDER — LEVOTHYROXINE SODIUM 112 UG/1
TABLET ORAL
Qty: 90 TABLET | Refills: 1 | Status: SHIPPED | OUTPATIENT
Start: 2024-10-28

## 2024-10-28 RX ORDER — POLYETHYLENE GLYCOL 3350 17 G/17G
POWDER, FOR SOLUTION ORAL
COMMUNITY
Start: 2024-04-23

## 2024-10-28 RX ORDER — LIDOCAINE 560 MG/1
PATCH PERCUTANEOUS; TOPICAL; TRANSDERMAL EVERY 24 HOURS
COMMUNITY
Start: 2024-09-24 | End: 2025-01-22

## 2024-10-28 RX ORDER — BUPROPION HYDROCHLORIDE 150 MG/1
150 TABLET ORAL
COMMUNITY
Start: 2024-10-22 | End: 2025-10-22

## 2024-10-28 RX ORDER — CETIRIZINE HYDROCHLORIDE 10 MG/1
10 TABLET ORAL 2 TIMES DAILY
COMMUNITY
Start: 2024-06-04

## 2024-10-28 ASSESSMENT — ENCOUNTER SYMPTOMS
DYSURIA: 0
NUMBNESS: 1
ADENOPATHY: 0
ARTHRALGIAS: 1
FATIGUE: 1

## 2024-11-02 DIAGNOSIS — M34.9 SYSTEMIC SCLEROSIS (MULTI): ICD-10-CM

## 2024-11-04 RX ORDER — METHOTREXATE 2.5 MG/1
20 TABLET ORAL WEEKLY
Qty: 104 TABLET | Refills: 1 | Status: SHIPPED | OUTPATIENT
Start: 2024-11-04 | End: 2025-05-05

## 2024-12-03 ENCOUNTER — APPOINTMENT (OUTPATIENT)
Dept: RHEUMATOLOGY | Facility: CLINIC | Age: 69
End: 2024-12-03
Payer: MEDICARE

## 2025-01-06 DIAGNOSIS — M15.0 PRIMARY OSTEOARTHRITIS INVOLVING MULTIPLE JOINTS: ICD-10-CM

## 2025-01-06 DIAGNOSIS — K21.9 GASTROESOPHAGEAL REFLUX DISEASE WITHOUT ESOPHAGITIS: ICD-10-CM

## 2025-01-06 DIAGNOSIS — C73 PAPILLARY MICROCARCINOMA OF THYROID (MULTI): Primary | ICD-10-CM

## 2025-01-06 DIAGNOSIS — M34.9 SYSTEMIC SCLEROSIS (MULTI): ICD-10-CM

## 2025-01-06 RX ORDER — OMEPRAZOLE 40 MG/1
40 CAPSULE, DELAYED RELEASE ORAL DAILY
Qty: 90 CAPSULE | Refills: 3 | Status: SHIPPED | OUTPATIENT
Start: 2025-01-06

## 2025-01-06 RX ORDER — HYDROXYCHLOROQUINE SULFATE 200 MG/1
200 TABLET, FILM COATED ORAL DAILY
Qty: 90 TABLET | Refills: 3 | Status: SHIPPED | OUTPATIENT
Start: 2025-01-06

## 2025-01-06 RX ORDER — DICLOFENAC SODIUM 75 MG/1
75 TABLET, DELAYED RELEASE ORAL 2 TIMES DAILY
Qty: 180 TABLET | Refills: 3 | Status: SHIPPED | OUTPATIENT
Start: 2025-01-06 | End: 2026-01-06

## 2025-01-13 DIAGNOSIS — M15.0 PRIMARY OSTEOARTHRITIS INVOLVING MULTIPLE JOINTS: ICD-10-CM

## 2025-01-13 DIAGNOSIS — I20.9 ANGINA PECTORIS: ICD-10-CM

## 2025-01-13 DIAGNOSIS — M34.9 SYSTEMIC SCLEROSIS (MULTI): ICD-10-CM

## 2025-01-13 RX ORDER — METHOTREXATE 2.5 MG/1
20 TABLET ORAL WEEKLY
Qty: 104 TABLET | Refills: 1 | Status: SHIPPED | OUTPATIENT
Start: 2025-01-13 | End: 2025-07-14

## 2025-01-13 RX ORDER — DICLOFENAC SODIUM 75 MG/1
75 TABLET, DELAYED RELEASE ORAL 2 TIMES DAILY
Qty: 180 TABLET | Refills: 3 | Status: SHIPPED | OUTPATIENT
Start: 2025-01-13 | End: 2025-01-15

## 2025-01-15 DIAGNOSIS — M15.0 PRIMARY OSTEOARTHRITIS INVOLVING MULTIPLE JOINTS: ICD-10-CM

## 2025-01-15 RX ORDER — DICLOFENAC SODIUM 75 MG/1
75 TABLET, DELAYED RELEASE ORAL 2 TIMES DAILY
Qty: 180 TABLET | Refills: 3 | Status: SHIPPED | OUTPATIENT
Start: 2025-01-15

## 2025-01-20 RX ORDER — DILTIAZEM HYDROCHLORIDE 60 MG/1
60 TABLET, FILM COATED ORAL 2 TIMES DAILY
Qty: 180 TABLET | Refills: 3 | Status: SHIPPED | OUTPATIENT
Start: 2025-01-20 | End: 2025-01-24

## 2025-01-20 NOTE — TELEPHONE ENCOUNTER
Please send the attached prescription to the patient's pharmacy as soon as possible. Thank you!    Requested Prescriptions     Pending Prescriptions Disp Refills    dilTIAZem (Cardizem) 60 mg immediate release tablet 180 tablet 3     Sig: Take 1 tablet (60 mg) by mouth 2 times a day.

## 2025-01-24 DIAGNOSIS — I20.9 ANGINA PECTORIS: ICD-10-CM

## 2025-01-24 RX ORDER — DILTIAZEM HYDROCHLORIDE 60 MG/1
60 TABLET, FILM COATED ORAL 2 TIMES DAILY
Qty: 180 TABLET | Refills: 3 | Status: SHIPPED | OUTPATIENT
Start: 2025-01-24 | End: 2026-01-24

## 2025-01-24 NOTE — TELEPHONE ENCOUNTER
Please send the attached prescription to the patient's pharmacy as soon as possible. Thank you!    Requested Prescriptions     Pending Prescriptions Disp Refills    dilTIAZem (Cardizem) 60 mg immediate release tablet [Pharmacy Med Name: DilTIAZem TAB] 180 tablet 3     Sig: Take 1 tablet (60 mg) by mouth 2 times a day.

## 2025-03-11 ENCOUNTER — HOSPITAL ENCOUNTER (OUTPATIENT)
Dept: RADIOLOGY | Facility: HOSPITAL | Age: 70
End: 2025-03-11
Payer: MEDICARE

## 2025-03-11 ENCOUNTER — APPOINTMENT (OUTPATIENT)
Dept: RADIOLOGY | Facility: HOSPITAL | Age: 70
End: 2025-03-11
Payer: COMMERCIAL

## 2025-03-12 ENCOUNTER — APPOINTMENT (OUTPATIENT)
Dept: RADIOLOGY | Facility: HOSPITAL | Age: 70
End: 2025-03-12
Payer: MEDICARE

## 2025-03-14 ENCOUNTER — HOSPITAL ENCOUNTER (OUTPATIENT)
Dept: RADIOLOGY | Facility: HOSPITAL | Age: 70
Discharge: HOME | End: 2025-03-14
Payer: MEDICARE

## 2025-03-14 DIAGNOSIS — M81.8 OTHER OSTEOPOROSIS WITHOUT CURRENT PATHOLOGICAL FRACTURE: ICD-10-CM

## 2025-03-14 PROCEDURE — 77080 DXA BONE DENSITY AXIAL: CPT

## 2025-03-15 DIAGNOSIS — E03.9 HYPOTHYROIDISM, UNSPECIFIED TYPE: ICD-10-CM

## 2025-03-16 RX ORDER — LEVOTHYROXINE SODIUM 112 UG/1
TABLET ORAL
Qty: 78 TABLET | Refills: 0 | Status: SHIPPED | OUTPATIENT
Start: 2025-03-16

## 2025-03-24 ENCOUNTER — HOSPITAL ENCOUNTER (OUTPATIENT)
Dept: RADIOLOGY | Facility: HOSPITAL | Age: 70
Discharge: HOME | End: 2025-03-24
Payer: MEDICARE

## 2025-03-24 ENCOUNTER — APPOINTMENT (OUTPATIENT)
Dept: ORTHOPEDIC SURGERY | Facility: CLINIC | Age: 70
End: 2025-03-24
Payer: COMMERCIAL

## 2025-03-24 DIAGNOSIS — M25.811 SHOULDER IMPINGEMENT, RIGHT: ICD-10-CM

## 2025-03-24 PROCEDURE — 73030 X-RAY EXAM OF SHOULDER: CPT | Mod: RT

## 2025-03-24 PROCEDURE — 73030 X-RAY EXAM OF SHOULDER: CPT | Mod: RIGHT SIDE | Performed by: STUDENT IN AN ORGANIZED HEALTH CARE EDUCATION/TRAINING PROGRAM

## 2025-03-25 ENCOUNTER — APPOINTMENT (OUTPATIENT)
Dept: ORTHOPEDIC SURGERY | Facility: CLINIC | Age: 70
End: 2025-03-25
Payer: COMMERCIAL

## 2025-03-25 DIAGNOSIS — M25.811 SHOULDER IMPINGEMENT, RIGHT: Primary | ICD-10-CM

## 2025-03-25 PROCEDURE — 99213 OFFICE O/P EST LOW 20 MIN: CPT | Performed by: ORTHOPAEDIC SURGERY

## 2025-03-25 ASSESSMENT — PAIN SCALES - GENERAL: PAINLEVEL_OUTOF10: 6

## 2025-03-25 ASSESSMENT — PAIN - FUNCTIONAL ASSESSMENT: PAIN_FUNCTIONAL_ASSESSMENT: 0-10

## 2025-03-25 ASSESSMENT — ENCOUNTER SYMPTOMS
BRUISES/BLEEDS EASILY: 0
ARTHRALGIAS: 1
CHILLS: 0
FEVER: 0
WHEEZING: 0
SHORTNESS OF BREATH: 0
FATIGUE: 0

## 2025-03-25 NOTE — PROGRESS NOTES
Reason for Appointment  No chief complaint on file.    History of Present Illness  Patient is a 70 y.o. female here today for follow-up evaluation of right shoulder. This is a [facility] case. We last saw the patient on 9/30/24 for mild right shoulder aching and we discussed being careful with heavy and sig activity. X-rays taken of the right shoulder on 3/24/25 were reviewed and showed no loosening of the proximal humeral plate, minimal arthritic changes. DEXA on 3/14/25 was reviewed and showed some osteopenia in the left tip. Today she feels when its cold outside her shoulder hurts. She has been using an electric blanket and has seen relief. She has good motion of the arm. She does care sitting for work. She says she has scoliosis moving into her neck. No recent falls or injuries. No other changes in past medical history, allergies, or medications.        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    Personal history of other diseases of the digestive system     History of esophageal reflux    Personal history of other diseases of the musculoskeletal system and connective tissue     History of osteopenia    Personal history of other endocrine, nutritional and metabolic disease     History of hypothyroidism    Raynaud's disease without gangrene 08/14/2023    Rheumatoid arthritis, unspecified     Rheumatoid arthritis    Shortness of breath on exertion 08/21/2023       Past Surgical History:   Procedure Laterality Date    BI US GUIDED BREAST LOCALIZATION AND BIOPSY RIGHT Right 7/18/2019    BI US GUIDED BREAST LOCALIZATION AND BIOPSY RIGHT [facility]    OTHER SURGICAL HISTORY  12/13/2022    Hysterectomy    OTHER  SURGICAL HISTORY  12/13/2022    Bladder surgery       Medication Documentation Review Audit       Reviewed by Kenya Murry MA (Medical Assistant) on 10/28/24 at 1128      Medication Order Taking? Sig Documenting Provider Last Dose Status   acetaminophen (Tylenol) 500 mg tablet [id number] Yes Take 2 tablets (1,000 mg) by mouth every 6 hours if needed. Historical Provider, MD  Active   albuterol 90 mcg/actuation inhaler [id number] Yes Inhale 2 puffs every 4 hours if needed for wheezing or shortness of breath. Historical Provider, MD  Active   ascorbic acid (Vitamin C) 500 mg tablet [id number] Yes Take 1 tablet (500 mg) by mouth once daily. Historical Provider, MD  Active   aspirin 81 mg chewable tablet [id number] Yes Chew 1 tablet (81 mg) once daily. Historical Provider, MD  Active   b complex vitamins capsule [id number] Yes Take 1 capsule by mouth once daily. Historical Provider, MD  Active   biotin 1 mg tablet [id number] Yes Take 1 tablet (1,000 mcg) by mouth once daily. Historical Provider, MD  Active   buPROPion XL (Wellbutrin XL) 150 mg 24 hr tablet [id number] Yes Take 1 tablet (150 mg) by mouth once daily. Historical Provider, MD  Active   calcium carbonate (Oscal) 500 mg calcium (1,250 mg) tablet [id number] Yes Take 1 tablet (1,250 mg) by mouth once daily. For 30 days Historical Provider, MD  Active   cetirizine (ZyrTEC) 10 mg tablet [id number] Yes Take 1 tablet (10 mg) by mouth twice a day. Historical Provider, MD  Active   diclofenac (Voltaren) 75 mg EC tablet [id number]  Take 1 tablet (75 mg) by mouth 2 times a day. Genesis Gudino MD  Active   dilTIAZem (Cardizem) 60 mg immediate release tablet [id number] Yes Take 1 tablet (60 mg) by mouth 2 times a day. Rudi Richardson MD  Active   folic acid (Folvite) 0.2 MG split tablet [id number] Yes Take 2 half tablet (0.4 mg) by mouth once daily. Historical Provider, MD  Active   folic acid/multivit,iron, (ONE DAILY FOR WOMEN ORAL) [id number] Yes Take 1 tablet by mouth  once daily. Historical Provider, MD  Active   gabapentin (Neurontin) 300 mg capsule [id number] Yes Take 1 capsule (300 mg) by mouth 3 times a day. Historical Provider, MD  Active   hydroxychloroquine (PlaqueniL) 200 mg tablet [id number] Yes Take 1 tablet (200 mg) by mouth once daily. Genesis Gudino MD  Active     Discontinued 10/28/24 0747   levothyroxine (Synthroid, Levoxyl) 112 mcg tablet [id number] Yes TAKE ONE TABLET BY MOUTH ONCE A DAY. SKIP TABLET ON SUNDAY Dori Khan MD  Active   lidocaine 4 % patch [id number] Yes once every 24 hours. Historical Provider, MD  Active   losartan (Cozaar) 100 mg tablet [id number] Yes Take by mouth. Historical Provider, MD  Active   methotrexate (Trexall) 2.5 mg tablet [id number] Yes Take 10 tablets (25 mg total) by mouth 1 (one) time per week. Genesis Gudino MD  Active   omeprazole (PriLOSEC) 40 mg DR capsule [id number]  Take 1 capsule (40 mg) by mouth once daily. Genesis Gudino MD  Active   ondansetron ODT (Zofran-ODT) 4 mg disintegrating tablet [id number] Yes dissolve 1 tablet on the tongue every six hours if needed for nausea Genesis Gudino MD  Active   polyethylene glycol (Glycolax, Miralax) 17 gram/dose powder [id number] Yes TAKE 17 GRAMS BY MOUTH DAILY FOR 14 DAYS AS DIRECTED Historical Provider, MD  Active   rosuvastatin (Crestor) 10 mg tablet [id number] Yes Take 1 tablet (10 mg) by mouth once daily. Historical Provider, MD  Active   traZODone (Desyrel) 50 mg tablet [id number] Yes Take 0.5 tablets (25 mg) by mouth once daily at bedtime. Historical Provider, MD  Active   Trelegy Ellipta 100-62.5-25 mcg blister with device [id number] Yes Inhale 1 puff once daily. Historical Provider, MD  Active                    Allergies   Allergen Reactions    Latex, Natural Rubber Other    Adhesive Tape-Silicones Other     skin degeneration with tape       Review of Systems   Constitutional:  Negative for chills, fatigue and fever.   Respiratory:  Negative for shortness of  breath and wheezing.    Cardiovascular:  Negative for chest pain and leg swelling.   Musculoskeletal:  Positive for arthralgias.   Allergic/Immunologic: Negative for immunocompromised state.   Hematological:  Does not bruise/bleed easily.       Exam   Over 150 degrees of forward flexion. Lacks 10 degrees of external rotation. She has more sig about 40 degrees of internal rotation. Minimal weakness in extremal rotation. Mild crepitation with rotation. Good deltoid function. Good pulses and sensation.   Assessment   Encounter Diagnosis   Name Primary?    Shoulder impingement, right Yes       Plan     We discussed watching overhead activities because that can flare up her shoulder. We discussed occasional injections for flare ups. She can follow up every 6 months. No x-ray will be needed in 6 months.       I, Radha Quintana, attest that this documentation has been prepared under the direction and in the presence of Praveen Myers MD.   By signing below, I, Praveen Myers MD, personally performed the services described in this documentation. All medical record entries made by the scribe were at my direction and in my presence. I have reviewed the chart and agree that the record reflects my personal performance and is accurate and complete.

## 2025-04-08 DIAGNOSIS — M34.9 SYSTEMIC SCLEROSIS (MULTI): ICD-10-CM

## 2025-04-08 DIAGNOSIS — E03.9 HYPOTHYROIDISM, UNSPECIFIED TYPE: ICD-10-CM

## 2025-04-08 RX ORDER — LEVOTHYROXINE SODIUM 112 UG/1
TABLET ORAL
Qty: 78 TABLET | Refills: 0 | Status: SHIPPED | OUTPATIENT
Start: 2025-04-08

## 2025-04-09 ENCOUNTER — APPOINTMENT (OUTPATIENT)
Dept: RADIOLOGY | Facility: CLINIC | Age: 70
End: 2025-04-09
Payer: COMMERCIAL

## 2025-04-09 RX ORDER — METHOTREXATE 2.5 MG/1
TABLET ORAL
Qty: 104 TABLET | Refills: 0 | Status: SHIPPED | OUTPATIENT
Start: 2025-04-09

## 2025-04-11 ENCOUNTER — HOSPITAL ENCOUNTER (OUTPATIENT)
Dept: RADIOLOGY | Facility: CLINIC | Age: 70
Discharge: HOME | End: 2025-04-11
Payer: MEDICARE

## 2025-04-11 DIAGNOSIS — S13.4XXA SPRAIN OF LIGAMENTS OF CERVICAL SPINE, INITIAL ENCOUNTER: ICD-10-CM

## 2025-04-11 DIAGNOSIS — M50.320 OTHER CERVICAL DISC DEGENERATION, MID-CERVICAL REGION, UNSPECIFIED LEVEL: ICD-10-CM

## 2025-04-11 DIAGNOSIS — M47.22 OTHER SPONDYLOSIS WITH RADICULOPATHY, CERVICAL REGION: ICD-10-CM

## 2025-04-11 DIAGNOSIS — S43.91XA SPRAIN OF UNSPECIFIED PARTS OF RIGHT SHOULDER GIRDLE, INITIAL ENCOUNTER: ICD-10-CM

## 2025-04-11 PROCEDURE — 72148 MRI LUMBAR SPINE W/O DYE: CPT

## 2025-04-29 ENCOUNTER — APPOINTMENT (OUTPATIENT)
Dept: RHEUMATOLOGY | Facility: CLINIC | Age: 70
End: 2025-04-29
Payer: MEDICARE

## 2025-04-29 VITALS
BODY MASS INDEX: 26 KG/M2 | SYSTOLIC BLOOD PRESSURE: 86 MMHG | WEIGHT: 129 LBS | OXYGEN SATURATION: 98 % | DIASTOLIC BLOOD PRESSURE: 56 MMHG | HEART RATE: 64 BPM | HEIGHT: 59 IN

## 2025-04-29 DIAGNOSIS — M34.9 SYSTEMIC SCLEROSIS (MULTI): ICD-10-CM

## 2025-04-29 DIAGNOSIS — M81.8 OTHER OSTEOPOROSIS WITHOUT CURRENT PATHOLOGICAL FRACTURE: ICD-10-CM

## 2025-04-29 DIAGNOSIS — M54.2 NECK PAIN: ICD-10-CM

## 2025-04-29 DIAGNOSIS — Z79.899 ENCOUNTER FOR LONG-TERM (CURRENT) USE OF HIGH-RISK MEDICATION: Primary | ICD-10-CM

## 2025-04-29 PROCEDURE — 1125F AMNT PAIN NOTED PAIN PRSNT: CPT | Performed by: INTERNAL MEDICINE

## 2025-04-29 PROCEDURE — G2211 COMPLEX E/M VISIT ADD ON: HCPCS | Performed by: INTERNAL MEDICINE

## 2025-04-29 PROCEDURE — 3074F SYST BP LT 130 MM HG: CPT | Performed by: INTERNAL MEDICINE

## 2025-04-29 PROCEDURE — 1159F MED LIST DOCD IN RCRD: CPT | Performed by: INTERNAL MEDICINE

## 2025-04-29 PROCEDURE — 1036F TOBACCO NON-USER: CPT | Performed by: INTERNAL MEDICINE

## 2025-04-29 PROCEDURE — 3078F DIAST BP <80 MM HG: CPT | Performed by: INTERNAL MEDICINE

## 2025-04-29 PROCEDURE — 99214 OFFICE O/P EST MOD 30 MIN: CPT | Performed by: INTERNAL MEDICINE

## 2025-04-29 PROCEDURE — 3008F BODY MASS INDEX DOCD: CPT | Performed by: INTERNAL MEDICINE

## 2025-04-29 RX ORDER — LIDOCAINE 560 MG/1
1 PATCH PERCUTANEOUS; TOPICAL; TRANSDERMAL DAILY
COMMUNITY
Start: 2024-09-24

## 2025-04-29 RX ORDER — METHOTREXATE 2.5 MG/1
25 TABLET ORAL WEEKLY
Qty: 120 TABLET | Refills: 3 | Status: SHIPPED | OUTPATIENT
Start: 2025-04-29 | End: 2026-04-29

## 2025-04-29 RX ORDER — FUROSEMIDE 20 MG/1
20 TABLET ORAL 2 TIMES WEEKLY
COMMUNITY
Start: 2025-02-10

## 2025-04-29 RX ORDER — BUPROPION HYDROCHLORIDE 300 MG/1
300 TABLET ORAL EVERY MORNING
COMMUNITY
Start: 2025-04-01

## 2025-04-29 ASSESSMENT — ENCOUNTER SYMPTOMS
FATIGUE: 1
ARTHRALGIAS: 1
NUMBNESS: 1
DYSURIA: 0
LOSS OF SENSATION IN FEET: 1
DEPRESSION: 0
ADENOPATHY: 0
OCCASIONAL FEELINGS OF UNSTEADINESS: 0

## 2025-04-29 ASSESSMENT — PAIN SCALES - GENERAL: PAINLEVEL_OUTOF10: 7

## 2025-04-29 NOTE — PROGRESS NOTES
Subjective   Patient ID: Radha Restrepo is a 70 y.o. female who presents for Follow-up.  HPI  Systemic sclerosis with features of Raynaud's, GERD, spinal stenosis, osteopenia.  Also has nonobstructing coronary artery disease, COPD.    DEXA 3/14/2025 lumbar spine -1, left total hip -0.5, left femoral neck -1.2  DEXA 1/11/2023 lumbar spine -1.7, left total hip 1.0, left femoral neck -1.4    Patient was last seen in October.  We had her continue on Plaquenil, methotrexate, Zofran.  She does follow with pain management, Dr Velasco  She did have a new MRI of the lumbar spine a few weeks ago That showed severe canal and foraminal narrowing at L5-S1 due to spondylolisthesis is unchanged compared to previous exam  She had Covid in Feb and afer she got over that she got bronchitis and then the COPD flared.    It took awhile for her to feel better.  Walking wasn't easy because of SOB and light headedness.  Would cough and get lightheaded.    Tomorrow she will see urologist because of occ burning.  Was told that her urethra was red when she had her pap  Next week she sees gyn because I came back abnormal  Her spine and neck has a lot of pain.  It aggravates the right shoulder.   She saw Dr. Myers who felt the shoulder joint   GERD comes and goes.  Sometimes it's ok.  Sometimes takes tums or pepto.  Review of Systems   Constitutional:  Positive for fatigue.   HENT:  Negative for congestion.    Respiratory:          Copd   Gastrointestinal:         Gets occasional reflux and nausea and will take Zofran.   Genitourinary:  Negative for dysuria.   Musculoskeletal:  Positive for arthralgias.   Neurological:  Positive for numbness.   Hematological:  Negative for adenopathy.       Objective   Physical Exam  GEN: NAD A&O x3 appropriate affect  HENT:no enlarged glands or thyroid  EYES:  no conjunctival redness, eyelids normal  LYMPH: no cervical lymph nodes  SKIN: no rashes seen on exposed skin  PULSES: +radials  TENDER POINTS: 0/18    MSK:  Squaring at the left CMC and first MCP joint  Hypertrophic changes the DIP and PIP of the bilateral hands  No swelling of the elbows  Anterior rotation bilateral shoulders  No swelling of the knees  No swelling of the ankles  Assessment/Plan       Systemic sclerosis with features of positive anti-SCL 70, Raynaud's, GERD, calcifications.  Also has COPD and nonobstructive coronary artery disease.    -Currently on Plaquenil and methotrexate.  Reviewed her recent blood work from her hospitalization.  Reviewed any drug toxicity and disease activity  -Reminded her to get an eye exam least once a year for Plaquenil toxicity  - Most of her complaints are from her neck and lower back.  Discussed about trying to do manual release/massage for her symptoms.  She does have lidocaine patches, NSAIDs, muscle relaxants.  She does see pain management.  -Currently on NSAID with her history of reflux symptoms.  Also on PPI # Pepto-Bismol has tried not to take had increase in symptoms.    Osteopenia    DEXA 3/14/2025 lumbar spine -1, left total hip -0.5, left femoral neck -1.2  DEXA 1/11/2023 lumbar spine -1.7, left total hip 1.0, left femoral neck -1.4   - Improved from previous

## 2025-05-09 LAB
CRP SERPL-MCNC: <3 MG/L
ERYTHROCYTE [SEDIMENTATION RATE] IN BLOOD BY WESTERGREN METHOD: 6 MM/H
T4 FREE SERPL-MCNC: 1.7 NG/DL (ref 0.8–1.8)
THYROGLOB AB SERPL-ACNC: NORMAL [IU]/ML
TSH SERPL-ACNC: 0.25 MIU/L (ref 0.4–4.5)

## 2025-05-12 LAB
T4 FREE SERPL-MCNC: 1.7 NG/DL (ref 0.8–1.8)
THYROGLOB AB SERPL-ACNC: <1 IU/ML
THYROGLOB SERPL-MCNC: <0.1 NG/ML
TSH SERPL-ACNC: 0.25 MIU/L (ref 0.4–4.5)

## 2025-05-13 ENCOUNTER — APPOINTMENT (OUTPATIENT)
Dept: ENDOCRINOLOGY | Facility: CLINIC | Age: 70
End: 2025-05-13
Payer: COMMERCIAL

## 2025-05-13 ENCOUNTER — APPOINTMENT (OUTPATIENT)
Dept: CARDIOLOGY | Facility: CLINIC | Age: 70
End: 2025-05-13
Payer: MEDICARE

## 2025-05-13 VITALS
HEART RATE: 76 BPM | BODY MASS INDEX: 26.13 KG/M2 | DIASTOLIC BLOOD PRESSURE: 70 MMHG | RESPIRATION RATE: 16 BRPM | SYSTOLIC BLOOD PRESSURE: 120 MMHG | HEIGHT: 59 IN | WEIGHT: 129.6 LBS

## 2025-05-13 DIAGNOSIS — C73 PAPILLARY MICROCARCINOMA OF THYROID (MULTI): ICD-10-CM

## 2025-05-13 DIAGNOSIS — E03.9 HYPOTHYROIDISM, UNSPECIFIED TYPE: Primary | ICD-10-CM

## 2025-05-13 PROCEDURE — 3074F SYST BP LT 130 MM HG: CPT | Performed by: INTERNAL MEDICINE

## 2025-05-13 PROCEDURE — 3008F BODY MASS INDEX DOCD: CPT | Performed by: INTERNAL MEDICINE

## 2025-05-13 PROCEDURE — 3078F DIAST BP <80 MM HG: CPT | Performed by: INTERNAL MEDICINE

## 2025-05-13 PROCEDURE — 1159F MED LIST DOCD IN RCRD: CPT | Performed by: INTERNAL MEDICINE

## 2025-05-13 PROCEDURE — 99213 OFFICE O/P EST LOW 20 MIN: CPT | Performed by: INTERNAL MEDICINE

## 2025-05-13 PROCEDURE — 1160F RVW MEDS BY RX/DR IN RCRD: CPT | Performed by: INTERNAL MEDICINE

## 2025-05-13 PROCEDURE — G2211 COMPLEX E/M VISIT ADD ON: HCPCS | Performed by: INTERNAL MEDICINE

## 2025-05-13 PROCEDURE — 1036F TOBACCO NON-USER: CPT | Performed by: INTERNAL MEDICINE

## 2025-05-13 RX ORDER — LEVOTHYROXINE SODIUM 88 UG/1
88 TABLET ORAL DAILY
Qty: 90 TABLET | Refills: 3 | Status: SHIPPED | OUTPATIENT
Start: 2025-05-13 | End: 2026-05-13

## 2025-05-13 RX ORDER — LOSARTAN POTASSIUM 50 MG/1
TABLET ORAL
COMMUNITY
Start: 2025-05-08

## 2025-05-13 ASSESSMENT — ENCOUNTER SYMPTOMS
NAUSEA: 0
COUGH: 0
SHORTNESS OF BREATH: 0
VOMITING: 0
CHILLS: 0
FEVER: 0
HEADACHES: 0
DIARRHEA: 0
FATIGUE: 0
PALPITATIONS: 0

## 2025-05-13 NOTE — PROGRESS NOTES
Endocrinology: Follow up visit  Subjective   Patient ID: Radha Resterpo is a 70 y.o. female who presents for Hypothyroidism and Goiter.    PCP: Jurgen Friedman,     HPI  Papillary micro carcinoma s/p total tx.  Surgery march 2023  Taking t4 as directed x6  Feels fine  Dealing with abnormal pap  Admitted for copd/bronchitis in feb: feeling fine since    Review of Systems   Constitutional:  Negative for chills, fatigue and fever.   Respiratory:  Negative for cough and shortness of breath.    Cardiovascular:  Negative for chest pain and palpitations.   Gastrointestinal:  Negative for diarrhea, nausea and vomiting.   Neurological:  Negative for headaches.       Problem List[1]     Home Meds:  Current Outpatient Medications   Medication Instructions    acetaminophen (TYLENOL) 1,000 mg, Every 6 hours PRN    albuterol 90 mcg/actuation inhaler 2 puffs, Every 4 hours PRN    ascorbic acid (Vitamin C) 500 mg tablet 1 tablet, Daily    aspirin 81 mg chewable tablet 1 tablet, Daily    b complex vitamins capsule 1 capsule, Daily    biotin 1,000 mcg, Daily    buPROPion XL (WELLBUTRIN XL) 300 mg, Every morning    calcium carbonate (Oscal) 500 mg calcium (1,250 mg) tablet 1 tablet, Daily    cetirizine (ZYRTEC) 10 mg, 2 times daily    diclofenac (VOLTAREN) 75 mg, oral, 2 times daily    dilTIAZem (CARDIZEM) 60 mg, oral, 2 times daily    folic acid (Folvite) 0.2 MG split tablet 2 half tablet, Daily    folic acid/multivit,iron, (ONE DAILY FOR WOMEN ORAL) 1 tablet, Daily    furosemide (LASIX) 20 mg, 2 times weekly    gabapentin (Neurontin) 300 mg capsule 1 capsule, 3 times daily    hydroxychloroquine (PLAQUENIL) 200 mg, oral, Daily    levothyroxine (Synthroid, Levoxyl) 112 mcg tablet TAKE 1 TABLET BY MOUTH ONCE  DAILY, EXCEPT SKIP TABLET ON  SUNDAY    lidocaine 4 % patch 1 tablet, Daily    losartan (Cozaar) 100 mg tablet Take by mouth.    losartan (Cozaar) 50 mg tablet     methotrexate (TREXALL) 25 mg, oral, Weekly    omeprazole  "(PRILOSEC) 40 mg, oral, Daily    ondansetron ODT (Zofran-ODT) 4 mg disintegrating tablet dissolve 1 tablet on the tongue every six hours if needed for nausea    polyethylene glycol (Glycolax, Miralax) 17 gram/dose powder TAKE 17 GRAMS BY MOUTH DAILY FOR 14 DAYS AS DIRECTED    rosuvastatin (CRESTOR) 10 mg, Daily    traZODone (DESYREL) 25 mg, Nightly    Trelegy Ellipta 100-62.5-25 mcg blister with device 1 puff, Daily        RX Allergies[2]     Objective   Vitals:    05/13/25 1205   BP: 120/70   Pulse: 76   Resp: 16      Vitals:    05/13/25 1205   Weight: 58.8 kg (129 lb 9.6 oz)      Body mass index is 26.18 kg/m².   Physical Exam  Constitutional:       Appearance: Normal appearance. She is normal weight.   HENT:      Head: Normocephalic and atraumatic.   Neck:      Comments: No palpable thyroid gland  Cardiovascular:      Rate and Rhythm: Normal rate and regular rhythm.      Heart sounds: No murmur heard.     No gallop.   Pulmonary:      Effort: Pulmonary effort is normal.      Breath sounds: Normal breath sounds.   Abdominal:      Palpations: Abdomen is soft.      Comments: benign   Neurological:      General: No focal deficit present.      Mental Status: She is alert and oriented to person, place, and time.      Deep Tendon Reflexes: Reflexes are normal and symmetric.   Psychiatric:         Behavior: Behavior is cooperative.         Labs:  Lab Results   Component Value Date    HGBA1C 5.4 02/04/2025    TSH 0.25 (L) 05/08/2025    FREET4 1.7 05/08/2025      No results found for: \"PR1\", \"THYROIDPAB\", \"TSI\"     Assessment/Plan   Assessment & Plan  Hypothyroidism, unspecified type    Adjust to 88 mcg daily recheck in 2 months  Doing well  Papillary microcarcinoma of thyroid (Multi)    Tg excellent      Electronically signed by:  Dori Khan MD 05/13/25 12:35 PM                   [1]   Patient Active Problem List  Diagnosis    Female bladder prolapse    Angina pectoris    Chest pain    Arthritis    Osteoarthritis    " Benign essential hypertension    Breast lump    Cervical lesion    Cystocele and rectocele with complete uterovaginal prolapse    Gastroesophageal reflux disease without esophagitis    Female stress incontinence    Fracture of humerus    Generalized osteoarthritis    Impingement syndrome of right shoulder    Knee pain    Myalgia    Nausea and vomiting    Pain in joint, hand    Postmenopausal bleeding    Raynaud's disease without gangrene    Right hip pain    Impingement of right shoulder    Shoulder injury    Right shoulder pain    Right wrist pain    Scleroderma (Multi)    Systemic sclerosis (Multi)    Dyspnea    Shortness of breath on exertion    Third degree hemorrhoids    Trigger finger, acquired    Urinary incontinence in female    Urinary retention with incomplete bladder emptying    Vaginal atrophy    Cyst of thyroid    Papillary microcarcinoma of thyroid (Multi)    Hypocalcemia    COPD (chronic obstructive pulmonary disease) with chronic bronchitis (Multi)    Degeneration of lumbar or lumbosacral intervertebral disc    H/O total thyroidectomy    Hyperlipidemia due to dietary fat intake    Insomnia    Liver cyst    Lupus    Osteoporosis without current pathological fracture    MP (metacarpophalangeal) joint sprain, initial encounter    Sprain of metacarpophalangeal (MCP) joint of left ring finger   [2]   Allergies  Allergen Reactions    Latex, Natural Rubber Other    Adhesive Tape-Silicones Other     skin degeneration with tape    Tetracycline GI Upset and GI intolerance

## 2025-05-18 DIAGNOSIS — K21.9 GASTROESOPHAGEAL REFLUX DISEASE WITHOUT ESOPHAGITIS: ICD-10-CM

## 2025-05-18 DIAGNOSIS — M34.9 SYSTEMIC SCLEROSIS (MULTI): ICD-10-CM

## 2025-05-20 RX ORDER — OMEPRAZOLE 40 MG/1
40 CAPSULE, DELAYED RELEASE ORAL DAILY
Qty: 90 CAPSULE | Refills: 0 | Status: SHIPPED | OUTPATIENT
Start: 2025-05-20

## 2025-06-02 PROCEDURE — 88175 CYTOPATH C/V AUTO FLUID REDO: CPT

## 2025-06-03 ENCOUNTER — LAB REQUISITION (OUTPATIENT)
Dept: LAB | Facility: HOSPITAL | Age: 70
End: 2025-06-03
Payer: MEDICARE

## 2025-06-03 DIAGNOSIS — Z12.4 ENCOUNTER FOR SCREENING FOR MALIGNANT NEOPLASM OF CERVIX: ICD-10-CM

## 2025-06-03 DIAGNOSIS — Z11.51 ENCOUNTER FOR SCREENING FOR HUMAN PAPILLOMAVIRUS (HPV): ICD-10-CM

## 2025-06-24 ENCOUNTER — OFFICE VISIT (OUTPATIENT)
Dept: ORTHOPEDIC SURGERY | Facility: CLINIC | Age: 70
End: 2025-06-24
Payer: COMMERCIAL

## 2025-06-24 DIAGNOSIS — S42.202A FRACTURE OF BOTH PROXIMAL HUMERI: ICD-10-CM

## 2025-06-24 DIAGNOSIS — S42.201A FRACTURE OF BOTH PROXIMAL HUMERI: ICD-10-CM

## 2025-06-24 DIAGNOSIS — M75.41 IMPINGEMENT SYNDROME OF RIGHT SHOULDER: Primary | ICD-10-CM

## 2025-06-24 PROCEDURE — 99213 OFFICE O/P EST LOW 20 MIN: CPT | Performed by: ORTHOPAEDIC SURGERY

## 2025-06-24 ASSESSMENT — PAIN - FUNCTIONAL ASSESSMENT: PAIN_FUNCTIONAL_ASSESSMENT: 0-10

## 2025-06-24 ASSESSMENT — ENCOUNTER SYMPTOMS
WHEEZING: 0
EYE DISCHARGE: 0
JOINT SWELLING: 0
ARTHRALGIAS: 1
CHILLS: 0
TROUBLE SWALLOWING: 0
FEVER: 0
COLOR CHANGE: 0
SHORTNESS OF BREATH: 0

## 2025-06-24 ASSESSMENT — PATIENT HEALTH QUESTIONNAIRE - PHQ9
2. FEELING DOWN, DEPRESSED OR HOPELESS: SEVERAL DAYS
SUM OF ALL RESPONSES TO PHQ9 QUESTIONS 1 AND 2: 1
1. LITTLE INTEREST OR PLEASURE IN DOING THINGS: NOT AT ALL

## 2025-06-24 ASSESSMENT — PAIN SCALES - GENERAL: PAINLEVEL_OUTOF10: 6

## 2025-06-24 NOTE — PROGRESS NOTES
Reason for Appointment  Chief Complaint   Patient presents with    Right Shoulder - Pain     History of Present Illness  Patient is a 70 y.o. female here today for a [facility] case follow-up evaluation of continued right shoulder pain. She has a history of a previous ORIF of a right proximal humerus fracture. Previous x-rays from 3 months ago did not show any loosening of hardware and a fully healed fracture. She has been having increased lateral and superior pain, more pain over the AC joint with overhead motion. She has not had any recent injections in the shoulder. She does feel crunching in the shoulder at times and she is also getting some posterior scapular pain. No other changes in her PMH, allergies, or medications    Medical History[1]    Surgical History[2]    Medication Documentation Review Audit       Reviewed by Giovanna Daniels MA (Medical Assistant) on 06/24/25 at 1042      Medication Order Taking? Sig Documenting Provider Last Dose Status   acetaminophen (Tylenol) 500 mg tablet [id number] Yes Take 2 tablets (1,000 mg) by mouth every 6 hours if needed. Historical Provider, MD  Active   albuterol 90 mcg/actuation inhaler [id number] Yes Inhale 2 puffs every 4 hours if needed for wheezing or shortness of breath. Historical Provider, MD  Active   ascorbic acid (Vitamin C) 500 mg tablet [id number] Yes Take 1 tablet (500 mg) by mouth once daily. Historical Provider, MD  Active   aspirin 81 mg chewable tablet [id number] Yes Chew 1 tablet (81 mg) once daily. Historical Provider, MD  Active   b complex vitamins capsule [id number] Yes Take 1 capsule by mouth once daily. Historical Provider, MD  Active   biotin 1 mg tablet [id number] Yes Take 1 tablet (1,000 mcg) by mouth once daily. Historical Provider, MD  Active   buPROPion XL (Wellbutrin XL) 300 mg 24 hr tablet [id number] Yes Take 1 tablet (300 mg) by mouth once daily in the morning. Historical Provider, MD  Active   calcium carbonate (Oscal) 500 mg calcium (1,250 mg)  tablet [id number] Yes Take 1 tablet by mouth once daily. For 30 days Historical MD Reddy  Active   cetirizine (ZyrTEC) 10 mg tablet [id number]  Take 1 tablet (10 mg) by mouth twice a day. Gelacio Blakely MD  Active   diclofenac (Voltaren) 75 mg EC tablet [id number] Yes take one tablet by mouth two times daily Genesis Gudino MD  Active   dilTIAZem (Cardizem) 60 mg immediate release tablet [id number] Yes Take 1 tablet (60 mg) by mouth 2 times a day. Rudi Richardson MD  Active   folic acid (Folvite) 0.2 MG split tablet [id number] Yes Take 2 half tablet (0.4 mg) by mouth once daily. Historical Provider, MD  Active   folic acid/multivit,iron, (ONE DAILY FOR WOMEN ORAL) [id number] Yes Take 1 tablet by mouth once daily. Gelacio Blakely MD  Active   furosemide (Lasix) 20 mg tablet [id number] Yes Take 1 tablet (20 mg) by mouth 2 times a week. Historical Provider, MD  Active   gabapentin (Neurontin) 300 mg capsule [id number] Yes Take 1 capsule (300 mg) by mouth 3 times a day. 2 capsules in morning and at bedtime and 1 cap in afternoon Historical Provider, MD  Active   hydroxychloroquine (PlaqueniL) 200 mg tablet [id number] Yes Take 1 tablet (200 mg) by mouth once daily. Genesis Gudino MD  Active   levothyroxine (Synthroid, Levoxyl) 88 mcg tablet [id number] Yes Take 1 tablet (88 mcg) by mouth early in the morning.. Take on an empty stomach at the same time each day, either 30 to 60 minutes prior to breakfast Dori Khan MD  Active   lidocaine 4 % patch [id number] Yes Take 1 tablet by mouth once daily. Historical Provider, MD  Active   losartan (Cozaar) 100 mg tablet [id number] Yes Take by mouth. Historical Provider, MD  Active   losartan (Cozaar) 50 mg tablet [id number] Yes    Patient taking differently: 1 tablet (50 mg) 2 times a day.    Gelacio Blakely MD  Active   methotrexate (Trexall) 2.5 mg tablet [id number] Yes Take 10 tablets (25 mg total) by mouth 1 (one) time per week. Genesis Gudino MD   Active   omeprazole (PriLOSEC) 40 mg DR capsule [id number] Yes take 1 capsule by mouth once daily Genesis Gudino MD  Active   ondansetron ODT (Zofran-ODT) 4 mg disintegrating tablet [id number] Yes dissolve 1 tablet on the tongue every six hours if needed for nausea Genesis Gudino MD  Active   polyethylene glycol (Glycolax, Miralax) 17 gram/dose powder [id number]  TAKE 17 GRAMS BY MOUTH DAILY FOR 14 DAYS AS DIRECTED Historical Provider, MD  Active   rosuvastatin (Crestor) 10 mg tablet [id number] Yes Take 1 tablet (10 mg) by mouth once daily. Historical Provider, MD  Active   traZODone (Desyrel) 50 mg tablet [id number] Yes Take 0.5 tablets (25 mg) by mouth once daily at bedtime. Historical Provider, MD  Active   Trelegy Ellipta 100-62.5-25 mcg blister with device [id number] Yes Inhale 1 puff once daily. Historical Provider, MD  Active                    RX Allergies[3]    Review of Systems   Constitutional:  Negative for chills and fever.   HENT:  Negative for postnasal drip, sneezing and trouble swallowing.    Eyes:  Negative for discharge.   Respiratory:  Negative for shortness of breath and wheezing.    Cardiovascular:  Negative for chest pain.   Musculoskeletal:  Positive for arthralgias. Negative for joint swelling.   Skin:  Negative for color change and pallor.   All other systems reviewed and are negative.    Exam   On exam the right shoulder shows a well healed scar, no swelling or effusion. She does have some crepitation over the AC joint and some joint line crepitation with rotation. She has 130 degrees of active forward flexion with pain but good cuff strength with resisted external rotation. Tender over the AC joint and some rigth trapezial and medial scapular border tenderness. Positive impingement signs on the right. Good pulses and sensation in the upper extremity    Assessment   Right shoulder impingement  Right proximal humerus fracture    Plan   At this point she is having more pain over the AC  joint and impingement symptoms. She has not had any injections recently. We will place a C9 for a right shoulder AC joint and a right shoulder subacromial injection. It is common after her previous injury and surgery to have recurrent pain and inflammation of the bursa as well as the AC joint. We are trying to avoid any further surgical intervention.     I, Joi Astorga PA-C, am acting as a scribe and attest that this documentation has been prepared under the direction and in the presence of Praveen Myers MD.    By signing below, I, Praveen Myers MD, personally performed the services described in this documentation. All medical record entries made by the scribe were at my direction and in my presence. I have reviewed the chart and agree that the record reflects my personal performance and is accurate and complete.                       [1]   Past Medical History:  Diagnosis Date    Angina pectoris 08/21/2023    Benign essential hypertension 08/21/2023    Cervicalgia     Neck pain    Chest pain 08/21/2023    Hyperlipidemia due to dietary fat intake 08/14/2023    Other conditions influencing health status     Osteoarthritis    Papillary microcarcinoma of thyroid (Multi) 10/12/2023    Personal history of other diseases of the digestive system     History of esophageal reflux    Personal history of other diseases of the musculoskeletal system and connective tissue     History of osteopenia    Personal history of other endocrine, nutritional and metabolic disease     History of hypothyroidism    Raynaud's disease without gangrene 08/14/2023    Rheumatoid arthritis, unspecified     Rheumatoid arthritis    Shortness of breath on exertion 08/21/2023   [2]   Past Surgical History:  Procedure Laterality Date    BI US GUIDED BREAST LOCALIZATION AND BIOPSY RIGHT Right 7/18/2019    BI US GUIDED BREAST LOCALIZATION AND BIOPSY RIGHT [facility]    OTHER SURGICAL HISTORY  12/13/2022    Hysterectomy    OTHER SURGICAL  HISTORY  12/13/2022    Bladder surgery   [3]   Allergies  Allergen Reactions    Latex, Natural Rubber Other    Adhesive Tape-Silicones Other     skin degeneration with tape    Tetracycline GI Upset and GI intolerance

## 2025-06-30 LAB
CYTOLOGY CMNT CVX/VAG CYTO-IMP: NORMAL
LAB AP HPV GENOTYPE QUESTION: YES
LAB AP HPV HR: NORMAL
LABORATORY COMMENT REPORT: NORMAL
MENSTRUAL HX REPORTED: NORMAL
PATH REPORT.TOTAL CANCER: NORMAL

## 2025-07-02 ENCOUNTER — APPOINTMENT (OUTPATIENT)
Facility: CLINIC | Age: 70
End: 2025-07-02
Payer: COMMERCIAL

## 2025-07-02 VITALS
SYSTOLIC BLOOD PRESSURE: 110 MMHG | WEIGHT: 128 LBS | OXYGEN SATURATION: 96 % | HEART RATE: 78 BPM | BODY MASS INDEX: 25.85 KG/M2 | DIASTOLIC BLOOD PRESSURE: 60 MMHG

## 2025-07-02 DIAGNOSIS — I73.00 RAYNAUD'S DISEASE WITHOUT GANGRENE: ICD-10-CM

## 2025-07-02 DIAGNOSIS — I20.9 ANGINA PECTORIS: Primary | ICD-10-CM

## 2025-07-02 DIAGNOSIS — E78.49 HYPERLIPIDEMIA DUE TO DIETARY FAT INTAKE: ICD-10-CM

## 2025-07-02 DIAGNOSIS — I10 BENIGN ESSENTIAL HYPERTENSION: ICD-10-CM

## 2025-07-02 DIAGNOSIS — R06.02 SOB (SHORTNESS OF BREATH): ICD-10-CM

## 2025-07-02 DIAGNOSIS — R06.02 SHORTNESS OF BREATH ON EXERTION: ICD-10-CM

## 2025-07-02 DIAGNOSIS — R07.2 PRECORDIAL PAIN: ICD-10-CM

## 2025-07-02 PROCEDURE — 99212 OFFICE O/P EST SF 10 MIN: CPT

## 2025-07-02 PROCEDURE — 1160F RVW MEDS BY RX/DR IN RCRD: CPT | Performed by: INTERNAL MEDICINE

## 2025-07-02 PROCEDURE — 1159F MED LIST DOCD IN RCRD: CPT | Performed by: INTERNAL MEDICINE

## 2025-07-02 PROCEDURE — 3078F DIAST BP <80 MM HG: CPT | Performed by: INTERNAL MEDICINE

## 2025-07-02 PROCEDURE — 99214 OFFICE O/P EST MOD 30 MIN: CPT | Performed by: INTERNAL MEDICINE

## 2025-07-02 PROCEDURE — 1125F AMNT PAIN NOTED PAIN PRSNT: CPT | Performed by: INTERNAL MEDICINE

## 2025-07-02 PROCEDURE — 3074F SYST BP LT 130 MM HG: CPT | Performed by: INTERNAL MEDICINE

## 2025-07-02 ASSESSMENT — PATIENT HEALTH QUESTIONNAIRE - PHQ9
1. LITTLE INTEREST OR PLEASURE IN DOING THINGS: NOT AT ALL
2. FEELING DOWN, DEPRESSED OR HOPELESS: NOT AT ALL
SUM OF ALL RESPONSES TO PHQ9 QUESTIONS 1 AND 2: 0

## 2025-07-02 ASSESSMENT — LIFESTYLE VARIABLES: TOTAL SCORE: 0

## 2025-07-02 ASSESSMENT — ENCOUNTER SYMPTOMS
OCCASIONAL FEELINGS OF UNSTEADINESS: 0
LOSS OF SENSATION IN FEET: 0
DEPRESSION: 0

## 2025-07-02 ASSESSMENT — PAIN SCALES - GENERAL: PAINLEVEL_OUTOF10: 6

## 2025-07-02 NOTE — PROGRESS NOTES
[facility] [place]    Interventional Cardiology    History of present illness:  This is a very pleasant 70-year-old female self-referred to my office for evaluation of episodes of chest pains. Patient had history of hypertension and scleroderma. Patient history of remote smoking. Cardiac catheterization in 2022 showed no major coronary disease.  Echocardiogram in November 2023 showed normal ejection fraction mild mitral and aortic valve regurgitation. Patient returns to my office for follow-up.  Patient was hospitalized in February for 2025 [facility].  At that time she was complaining of shortness of breath of sudden onset reported to me happened within few hours.  Was not having significant cough.  She may have had orthopnea but she cannot recall.  Denies lower extremity edema.  She was told that she had bronchitis as well as acute decompensated heart failure.  Her echocardiogram in February 2025 showed normal ejection fraction no major valve abnormalities.  Troponin were negative.  EKG within normal limits.  Patient returns to my office for follow-up.  Denies having chest pain.  Still complaining of some shortness of breath with activity.  Denies having nausea vomiting diaphoresis.  No lower extremity edema.  Has been using furosemide twice weekly recommended by her pulmonologist.    Medical History[1]    Surgical History[2]    Allergies[3]     reports that she quit smoking about 16 years ago. Her smoking use included cigarettes. She has a 38 pack-year smoking history. She has never been exposed to tobacco smoke. She has never used smokeless tobacco. She reports that she does not currently use alcohol. She reports that she does not use drugs.    Family History[4]    Patient's Medications   New Prescriptions    No medications on file   Previous Medications    ACETAMINOPHEN (TYLENOL) 500 MG TABLET    Take 2 tablets (1,000 mg) by mouth every 6 hours if needed.     ALBUTEROL 90 MCG/ACTUATION INHALER    Inhale 2 puffs every 4 hours if needed for wheezing or shortness of breath.    ASCORBIC ACID (VITAMIN C) 500 MG TABLET    Take 1 tablet (500 mg) by mouth once daily.    ASPIRIN 81 MG CHEWABLE TABLET    Chew and swallow 1 tablet (81 mg) once daily.    B COMPLEX VITAMINS CAPSULE    Take 1 capsule by mouth once daily.    BIOTIN 1 MG TABLET    Take 1 tablet (1,000 mcg) by mouth once daily.    BUPROPION XL (WELLBUTRIN XL) 300 MG 24 HR TABLET    Take 1 tablet (300 mg) by mouth once daily in the morning.    CALCIUM CARBONATE (OSCAL) 500 MG CALCIUM (1,250 MG) TABLET    Take 1 tablet by mouth once daily. For 30 days    CETIRIZINE (ZYRTEC) 10 MG TABLET    Take 1 tablet (10 mg) by mouth twice a day.    DICLOFENAC (VOLTAREN) 75 MG EC TABLET    take one tablet by mouth two times daily    DILTIAZEM (CARDIZEM) 60 MG IMMEDIATE RELEASE TABLET    Take 1 tablet (60 mg) by mouth 2 times a day.    FOLIC ACID (FOLVITE) 0.2 MG SPLIT TABLET    Take 2 half tablet (0.4 mg) by mouth once daily.    FOLIC ACID/MULTIVIT,IRON, (ONE DAILY FOR WOMEN ORAL)    Take 1 tablet by mouth once daily.    FUROSEMIDE (LASIX) 20 MG TABLET    Take 1 tablet (20 mg) by mouth 2 times a week.    GABAPENTIN (NEURONTIN) 300 MG CAPSULE    Take 1 capsule (300 mg) by mouth 3 times a day. 2 capsules in morning and at bedtime and 1 cap in afternoon    HYDROXYCHLOROQUINE (PLAQUENIL) 200 MG TABLET    Take 1 tablet (200 mg) by mouth once daily.    LEVOTHYROXINE (SYNTHROID, LEVOXYL) 88 MCG TABLET    Take 1 tablet (88 mcg) by mouth early in the morning.. Take on an empty stomach at the same time each day, either 30 to 60 minutes prior to breakfast    LIDOCAINE 4 % PATCH    Take 1 tablet by mouth once daily.    LOSARTAN (COZAAR) 100 MG TABLET    Take by mouth.    LOSARTAN (COZAAR) 50 MG TABLET        METHOTREXATE (TREXALL) 2.5 MG TABLET    Take 10 tablets (25 mg total) by mouth 1 (one) time per week.    OMEPRAZOLE (PRILOSEC) 40 MG   CAPSULE    take 1 capsule by mouth once daily    ONDANSETRON ODT (ZOFRAN-ODT) 4 MG DISINTEGRATING TABLET    dissolve 1 tablet on the tongue every six hours if needed for nausea    POLYETHYLENE GLYCOL (GLYCOLAX, MIRALAX) 17 GRAM/DOSE POWDER    TAKE 17 GRAMS BY MOUTH DAILY FOR 14 DAYS AS DIRECTED    ROSUVASTATIN (CRESTOR) 10 MG TABLET    Take 1 tablet (10 mg) by mouth once daily.    TRAZODONE (DESYREL) 50 MG TABLET    Take 0.5 tablets (25 mg) by mouth once daily at bedtime.    TRELEGY ELLIPTA 100-62.5-25 MCG BLISTER WITH DEVICE    Inhale 1 puff once daily.   Modified Medications    No medications on file   Discontinued Medications    No medications on file       Objective   Physical Exam  General: Patient in no acute distress   HEENT: Atraumatic normocephalic.  Neck: Supple, jugular venous pressure within normal limit.  No bruits  Lungs: Clear to auscultation bilaterally  Cardiovascular: Regular rate and rhythm, normal heart sounds, no murmurs rubs or gallops  Abdomen: Soft nontender nondistended.  Normal bowel sounds.  Extremities: Warm to touch, no edema.    Lab Review   Lab Requisition on 06/02/2025   Component Date Value    Case Report 06/02/2025                      Value:Gynecologic Cytology                              Case: [id number]                                   Authorizing Provider:  Juliet Toledo MD        Collected:           06/02/2025 1000              Ordering Location:     [facility]       Received:            06/03/2025 1434                                     Center                                                                       First Screen:          Jarod Coppola, CT                                                    Specimen:    ThinPrep Liquid-Based Pap-Imaging System Screen, VAGINA, SCREENING, VAGINAL CUFF           Final Cytological Interp* 06/02/2025                      Value:    A. THINPREP PAP VAGINA, SCREENING - VAGINAL CUFF    Specimen Adequacy  Satisfactory  for evaluation  Quality Indicatory: Limited in cellularity. Specimen consists predominantly of blood    General Categorization  Negative for intraepithelial lesion or malignancy.    Descriptive Interpretation  Negative for intraepithelial lesion or malignancy  Cellular changes consistent with atrophy      Specimen does not meet the requisition-stated criteria for HPV testing. See Pap test interpretation above.          06/02/2025                      Value:Slide(s) initially screened by ZAINA Forbes at [facility] [id number] [facility] [id number]  By the signature on this report, the individual or group listed as making the Final Interpretation/Diagnosis certifies that they have reviewed this case.       ThinPrep Imaging System 06/02/2025                      Value:This specimen has been analyzed by the ThinPrep Imaging System ([employer] an automated imaging and review system, which assists the laboratory in evaluating cells on ThinPrep Pap tests. Following automated imaging, selected fields from every slide were reviewed by a cytotechnologist and/or pathologist.        Educational Note 06/02/2025                      Value:Cervical cytology is a screening procedure primarily for squamous cancers and precursors and has associated false-negative and false-positives results as evidenced by published data. Your patient's test should be interpreted in this context, together with the patient's history and clinical findings. Regular sampling and follow-up of unexplained clinical signs and symptoms are recommended to minimize false negative results.      Perform HPV HR test? 06/02/2025 Reflex if ASCUS only     Include HPV Genotype? 06/02/2025 Yes     Menstrual status 06/02/2025                      Value:Hysterectomy  Comment: 2019     Orders Only on 05/08/2025   Component Date Value    THYROGLOBULIN ANTIBODY 05/08/2025 <1     THYROGLOBULIN 05/08/2025 <0.1     T4, FREE 05/08/2025 1.7      TSH 05/08/2025 0.25 (L)         Assessment/Plan   Problem List[5]   This is a very pleasant 70-year-old female self-referred to my office for evaluation of episodes of chest pains. Patient had history of hypertension and scleroderma. Patient history of remote smoking. Cardiac catheterization in 2022 showed no major coronary disease.  Echocardiogram in November 2023 showed normal ejection fraction mild mitral and aortic valve regurgitation. Patient returns to my office for follow-up.  Patient was hospitalized in February for 2025 [facility].  At that time she was complaining of shortness of breath of sudden onset reported to me happened within few hours.  Was not having significant cough.  She may have had orthopnea but she cannot recall.  Denies lower extremity edema.  She was told that she had bronchitis as well as acute decompensated heart failure.  Her echocardiogram in February 2025 showed normal ejection fraction no major valve abnormalities.  Troponin were negative.  EKG within normal limits.  Patient returns to my office for follow-up.  Denies having chest pain.  Still complaining of some shortness of breath with activity.  Denies having nausea vomiting diaphoresis.  No lower extremity edema.  Has been using furosemide twice weekly recommended by her pulmonologist.    Physical examination is unremarkable.  I am not sure if her hospitalization was for acute decompensated heart failure.  However it was of abrupt nature.  It could be flash pulmonary edema.  Currently appears euvolemic.  Physical examination unremarkable.  I do not hear wheezing.  Patient does have underlying COPD.  At this point my recommendation is to continue current medications.  Will check basic metabolic panel and BNP to make sure she is not excessively diuresed.  Will adjust her treatment after labs result.  Otherwise stable cardiac wise we will follow-up in 4 months.  Reviewed her hospitalization at [facility].  Troponin  were normal.  EKG within normal limits.  I do not see indication for ischemic workup.  Discussed with her lifestyle modification.    Rudi Richardson MD             [1]   Past Medical History:  Diagnosis Date    Angina pectoris 08/21/2023    Benign essential hypertension 08/21/2023    Cervicalgia     Neck pain    Chest pain 08/21/2023    COPD (chronic obstructive pulmonary disease) (Multi)     Hyperlipidemia due to dietary fat intake 08/14/2023    Other conditions influencing health status     Osteoarthritis    Papillary microcarcinoma of thyroid (Multi) 10/12/2023    Personal history of other diseases of the digestive system     History of esophageal reflux    Personal history of other diseases of the musculoskeletal system and connective tissue     History of osteopenia    Personal history of other endocrine, nutritional and metabolic disease     History of hypothyroidism    Raynaud's disease without gangrene 08/14/2023    Rheumatoid arthritis, unspecified     Rheumatoid arthritis    Shortness of breath on exertion 08/21/2023    Sleep apnea    [2]   Past Surgical History:  Procedure Laterality Date    BI US GUIDED BREAST LOCALIZATION AND BIOPSY RIGHT Right 7/18/2019    BI US GUIDED BREAST LOCALIZATION AND BIOPSY RIGHT [facility]    OTHER SURGICAL HISTORY  12/13/2022    Hysterectomy    OTHER SURGICAL HISTORY  12/13/2022    Bladder surgery   [3]   Allergies  Allergen Reactions    Latex, Natural Rubber Other    Adhesive Tape-Silicones Other     skin degeneration with tape    Tetracycline GI Upset and GI intolerance   [4]   Family History  Problem Relation Name Age of Onset    Diabetes Mother Radha dickens     Heart disease Mother Radha dickens     Arthritis Mother Radha dickens     COPD Mother Radha dickens     Hypertension Mother Radha dickens     Depression Mother Radha dickens     Thyroid cancer Father      Thyroid cancer Sister Stefania     Diabetes type II Sister Stefania     Lung disease Sister Steafnia     Diabetes  Brother Juan Pablo     Fainting Brother Juan Pablo     Heart attack Brother Juan Pablo     Hyperlipidemia Brother Juan Pablo     Hypertension Brother Juan Pablo     Obesity Brother Juan Pablo     Hepatitis Daughter      Other (partial thyriodectomy) Daughter      Other (hysterectomy) Daughter      Other (gastric bypass) Son     [5]   Patient Active Problem List  Diagnosis    Female bladder prolapse    Angina pectoris    Chest pain    Arthritis    Osteoarthritis    Benign essential hypertension    Breast lump    Cervical lesion    Cystocele and rectocele with complete uterovaginal prolapse    Gastroesophageal reflux disease without esophagitis    Female stress incontinence    Fracture of humerus    Generalized osteoarthritis    Impingement syndrome of right shoulder    Knee pain    Myalgia    Nausea and vomiting    Pain in joint, hand    Postmenopausal bleeding    Raynaud's disease without gangrene    Right hip pain    Impingement of right shoulder    Shoulder injury    Right shoulder pain    Right wrist pain    Scleroderma (Multi)    Systemic sclerosis (Multi)    Dyspnea    Shortness of breath on exertion    Third degree hemorrhoids    Trigger finger, acquired    Urinary incontinence in female    Urinary retention with incomplete bladder emptying    Vaginal atrophy    Cyst of thyroid    Papillary microcarcinoma of thyroid (Multi)    Hypocalcemia    COPD (chronic obstructive pulmonary disease) with chronic bronchitis (Multi)    Degeneration of lumbar or lumbosacral intervertebral disc    H/O total thyroidectomy    Hyperlipidemia due to dietary fat intake    Insomnia    Liver cyst    Lupus    Osteoporosis without current pathological fracture    MP (metacarpophalangeal) joint sprain, initial encounter    Sprain of metacarpophalangeal (MCP) joint of left ring finger

## 2025-07-12 LAB
ANION GAP SERPL CALCULATED.4IONS-SCNC: 10 MMOL/L (CALC) (ref 7–17)
BNP SERPL-MCNC: NORMAL PG/ML
BUN SERPL-MCNC: 19 MG/DL (ref 7–25)
BUN/CREAT SERPL: NORMAL (CALC) (ref 6–22)
CALCIUM SERPL-MCNC: 9.3 MG/DL (ref 8.6–10.4)
CHLORIDE SERPL-SCNC: 105 MMOL/L (ref 98–110)
CO2 SERPL-SCNC: 26 MMOL/L (ref 20–32)
CREAT SERPL-MCNC: 0.63 MG/DL (ref 0.5–1.05)
EGFRCR SERPLBLD CKD-EPI 2021: 97 ML/MIN/1.73M2
GLUCOSE SERPL-MCNC: 89 MG/DL (ref 65–99)
POTASSIUM SERPL-SCNC: 4.4 MMOL/L (ref 3.5–5.3)
SODIUM SERPL-SCNC: 141 MMOL/L (ref 135–146)
TSH SERPL-ACNC: 0.63 MIU/L (ref 0.4–4.5)

## 2025-07-14 LAB
ANION GAP SERPL CALCULATED.4IONS-SCNC: 10 MMOL/L (CALC) (ref 7–17)
BNP SERPL-MCNC: 10 PG/ML
BUN SERPL-MCNC: 19 MG/DL (ref 7–25)
BUN/CREAT SERPL: NORMAL (CALC) (ref 6–22)
CALCIUM SERPL-MCNC: 9.3 MG/DL (ref 8.6–10.4)
CHLORIDE SERPL-SCNC: 105 MMOL/L (ref 98–110)
CO2 SERPL-SCNC: 26 MMOL/L (ref 20–32)
CREAT SERPL-MCNC: 0.63 MG/DL (ref 0.5–1.05)
EGFRCR SERPLBLD CKD-EPI 2021: 97 ML/MIN/1.73M2
GLUCOSE SERPL-MCNC: 89 MG/DL (ref 65–99)
POTASSIUM SERPL-SCNC: 4.4 MMOL/L (ref 3.5–5.3)
SODIUM SERPL-SCNC: 141 MMOL/L (ref 135–146)

## 2025-07-15 ENCOUNTER — OFFICE VISIT (OUTPATIENT)
Dept: ORTHOPEDIC SURGERY | Facility: CLINIC | Age: 70
End: 2025-07-15
Payer: COMMERCIAL

## 2025-07-15 DIAGNOSIS — M75.41 IMPINGEMENT SYNDROME OF RIGHT SHOULDER: Primary | ICD-10-CM

## 2025-07-15 PROCEDURE — 2500000004 HC RX 250 GENERAL PHARMACY W/ HCPCS (ALT 636 FOR OP/ED): Performed by: ORTHOPAEDIC SURGERY

## 2025-07-15 PROCEDURE — 20611 DRAIN/INJ JOINT/BURSA W/US: CPT | Mod: RT | Performed by: ORTHOPAEDIC SURGERY

## 2025-07-15 PROCEDURE — 20606 DRAIN/INJ JOINT/BURSA W/US: CPT | Mod: RT | Performed by: ORTHOPAEDIC SURGERY

## 2025-07-15 PROCEDURE — 99213 OFFICE O/P EST LOW 20 MIN: CPT | Mod: 25 | Performed by: ORTHOPAEDIC SURGERY

## 2025-07-15 RX ADMIN — BETAMETHASONE DIPROPIONATE 40 MG: 0.5 OINTMENT TOPICAL at 08:55

## 2025-07-15 RX ADMIN — LIDOCAINE HYDROCHLORIDE 1 ML: 10 INJECTION, SOLUTION INFILTRATION; PERINEURAL at 08:55

## 2025-07-15 RX ADMIN — METHYLPREDNISOLONE ACETATE 30 MG: 40 INJECTION, SUSPENSION INTRA-ARTICULAR; INTRALESIONAL; INTRAMUSCULAR; SOFT TISSUE at 08:55

## 2025-07-15 RX ADMIN — LIDOCAINE HYDROCHLORIDE 3 ML: 10 INJECTION, SOLUTION INFILTRATION; PERINEURAL at 08:55

## 2025-07-15 ASSESSMENT — PAIN SCALES - GENERAL: PAINLEVEL_OUTOF10: 8

## 2025-07-15 ASSESSMENT — PAIN - FUNCTIONAL ASSESSMENT: PAIN_FUNCTIONAL_ASSESSMENT: 0-10

## 2025-07-15 NOTE — PROGRESS NOTES
Reason for Appointment  Chief Complaint   Patient presents with    Right Shoulder - Injections     History of Present Illness  This is a [employer] approved injection for right subacromial and ac joint injection. At this point, the patient is experiencing right shoulder pain that is consistent with right shoulder impingement on clinical exam. We will do one cortisone injection to the right subacromial space and ac joint in hopes to calm their symptoms nicely. Pt understands the small risk of infection and warning signs including flare reaction. Follow-up in 6 weeks.     Assessment   Right shoulder impingement     Patient ID: Radha Restrepo is a 70 y.o. female.    L Inj/Asp: R subacromial bursa on 7/15/2025 8:55 AM  Indications: pain  Details: 22 G needle, ultrasound-guided  Medications: 40 mg triamcinolone acetonide 40 mg/mL; 3 mL lidocaine 10 mg/mL (1 %)  Outcome: tolerated well, no immediate complications    After discussing the risks and benefits of the procedure with proceeded with an injection.  Using ultrasound guidance we identified the acromion, humeral head and the subacromial bursa, images obtained and saved. We then sterilely injected the right subacrominal space with a mixture of 40 mg of Kenalog and 1 cc of 1 % lidocaine. Pt tolerated the procedure well without any adverse reactions.    Procedure, treatment alternatives, risks and benefits explained, specific risks discussed. Consent was given by the patient. Immediately prior to procedure a time out was called to verify the correct patient, procedure, equipment, support staff and site/side marked as required. Patient was prepped and draped in the usual sterile fashion.       M Inj/Asp: R acromioclavicular on 7/15/2025 8:55 AM  Indications: pain  Details: ultrasound-guided  Medications: 1 mL lidocaine 10 mg/mL (1 %); 30 mg methylPREDNISolone acetate 40 mg/mL  Outcome: tolerated well, no immediate complications    After discussing the risks and benefits of  the procedure we proceeded with the injection. Using ultrasound guidance we anteriorly identified the head of the acromion and of the clavicle also identified the joint space, images obtained and saved.  We sterilely injected a mixture of 30 mg of Kenalog and 1 cc of 1% lidocaine into the right AC joint. Pt tolerated the procedure well without any adverse effects.    Procedure, treatment alternatives, risks and benefits explained, specific risks discussed. Consent was given by the patient. Immediately prior to procedure a time out was called to verify the correct patient, procedure, equipment, support staff and site/side marked as required. Patient was prepped and draped in the usual sterile fashion.         I, Radha Quintana, attest that this documentation has been prepared under the direction and in the presence of Praveen Myers MD.   By signing below, I, Praveen Myers MD, personally performed the services described in this documentation. All medical record entries made by the scribe were at my direction and in my presence. I have reviewed the chart and agree that the record reflects my personal performance and is accurate and complete.

## 2025-07-17 RX ORDER — LIDOCAINE HYDROCHLORIDE 10 MG/ML
3 INJECTION, SOLUTION INFILTRATION; PERINEURAL
Status: COMPLETED | OUTPATIENT
Start: 2025-07-15 | End: 2025-07-15

## 2025-07-17 RX ORDER — LIDOCAINE HYDROCHLORIDE 10 MG/ML
1 INJECTION, SOLUTION INFILTRATION; PERINEURAL
Status: COMPLETED | OUTPATIENT
Start: 2025-07-15 | End: 2025-07-15

## 2025-07-17 RX ORDER — METHYLPREDNISOLONE ACETATE 40 MG/ML
30 INJECTION, SUSPENSION INTRA-ARTICULAR; INTRALESIONAL; INTRAMUSCULAR; SOFT TISSUE
Status: COMPLETED | OUTPATIENT
Start: 2025-07-15 | End: 2025-07-15

## 2025-07-17 RX ORDER — TRIAMCINOLONE ACETONIDE 40 MG/ML
40 INJECTION, SUSPENSION INTRA-ARTICULAR; INTRAMUSCULAR
Status: COMPLETED | OUTPATIENT
Start: 2025-07-15 | End: 2025-07-15

## 2025-08-19 ENCOUNTER — APPOINTMENT (OUTPATIENT)
Dept: ORTHOPEDIC SURGERY | Facility: CLINIC | Age: 70
End: 2025-08-19
Payer: COMMERCIAL

## 2025-09-02 ENCOUNTER — HOSPITAL ENCOUNTER (OUTPATIENT)
Dept: RADIOLOGY | Facility: HOSPITAL | Age: 70
Discharge: HOME | End: 2025-09-02
Payer: MEDICARE

## 2025-09-02 DIAGNOSIS — M50.90 CERVICAL DISC DISORDER, UNSPECIFIED, UNSPECIFIED CERVICAL REGION: ICD-10-CM

## 2025-09-02 DIAGNOSIS — M89.8X1 OTHER SPECIFIED DISORDERS OF BONE, SHOULDER: ICD-10-CM

## 2025-09-02 PROCEDURE — 72141 MRI NECK SPINE W/O DYE: CPT | Performed by: RADIOLOGY

## 2025-09-02 PROCEDURE — 73221 MRI JOINT UPR EXTREM W/O DYE: CPT | Mod: RT

## 2025-09-02 PROCEDURE — 72141 MRI NECK SPINE W/O DYE: CPT

## 2025-09-02 PROCEDURE — 73221 MRI JOINT UPR EXTREM W/O DYE: CPT | Mod: RIGHT SIDE | Performed by: RADIOLOGY

## 2025-09-16 ENCOUNTER — APPOINTMENT (OUTPATIENT)
Dept: ORTHOPEDIC SURGERY | Facility: CLINIC | Age: 70
End: 2025-09-16
Payer: COMMERCIAL

## 2025-09-22 ENCOUNTER — APPOINTMENT (OUTPATIENT)
Dept: ORTHOPEDIC SURGERY | Facility: CLINIC | Age: 70
End: 2025-09-22
Payer: COMMERCIAL

## 2025-10-28 ENCOUNTER — APPOINTMENT (OUTPATIENT)
Dept: RHEUMATOLOGY | Facility: CLINIC | Age: 70
End: 2025-10-28
Payer: MEDICARE

## 2025-11-04 ENCOUNTER — APPOINTMENT (OUTPATIENT)
Dept: CARDIOLOGY | Facility: CLINIC | Age: 70
End: 2025-11-04
Payer: MEDICARE